# Patient Record
Sex: FEMALE | Race: WHITE | NOT HISPANIC OR LATINO | Employment: FULL TIME | URBAN - METROPOLITAN AREA
[De-identification: names, ages, dates, MRNs, and addresses within clinical notes are randomized per-mention and may not be internally consistent; named-entity substitution may affect disease eponyms.]

---

## 2017-01-24 PROBLEM — R63.1 POLYDIPSIA: Status: ACTIVE | Noted: 2017-01-24

## 2017-01-24 PROBLEM — I10 HYPERTENSION: Status: ACTIVE | Noted: 2017-01-24

## 2017-01-25 ENCOUNTER — OFFICE VISIT (OUTPATIENT)
Dept: FAMILY MEDICINE CLINIC | Facility: CLINIC | Age: 27
End: 2017-01-25

## 2017-01-25 VITALS
DIASTOLIC BLOOD PRESSURE: 68 MMHG | BODY MASS INDEX: 43.4 KG/M2 | OXYGEN SATURATION: 97 % | TEMPERATURE: 98.6 F | WEIGHT: 293 LBS | HEART RATE: 93 BPM | HEIGHT: 69 IN | SYSTOLIC BLOOD PRESSURE: 142 MMHG

## 2017-01-25 DIAGNOSIS — Z23 IMMUNIZATION DUE: ICD-10-CM

## 2017-01-25 DIAGNOSIS — J02.9 SORE THROAT: Primary | ICD-10-CM

## 2017-01-25 DIAGNOSIS — IMO0001 ELEVATED BLOOD PRESSURE: ICD-10-CM

## 2017-01-25 PROBLEM — J45.909 AB (ASTHMATIC BRONCHITIS): Status: ACTIVE | Noted: 2017-01-25

## 2017-01-25 LAB
EXPIRATION DATE: NORMAL
INTERNAL CONTROL: NORMAL
Lab: NORMAL
S PYO AG THROAT QL: NEGATIVE

## 2017-01-25 PROCEDURE — 99214 OFFICE O/P EST MOD 30 MIN: CPT | Performed by: NURSE PRACTITIONER

## 2017-01-25 PROCEDURE — 87081 CULTURE SCREEN ONLY: CPT | Performed by: NURSE PRACTITIONER

## 2017-01-25 PROCEDURE — 87880 STREP A ASSAY W/OPTIC: CPT | Performed by: NURSE PRACTITIONER

## 2017-01-25 PROCEDURE — 90471 IMMUNIZATION ADMIN: CPT | Performed by: NURSE PRACTITIONER

## 2017-01-25 PROCEDURE — 90715 TDAP VACCINE 7 YRS/> IM: CPT | Performed by: NURSE PRACTITIONER

## 2017-01-25 NOTE — PROGRESS NOTES
Subjective   Herbie Gloria is a 26 y.o. female.     History of Present Illness Complaining of 2 day so nasal congestion, sore throat, and cold chills. Denies sinus pressure, cough, SOB, and wheezing. Treated with Dayquil and Nyquil. No flu vacc.  Denies headache, chest pain, SOB, edema. No known history of elevated blood pressure. Diet is high in sodium.  She does not smoke or exercise.    The following portions of the patient's history were reviewed and updated as appropriate: allergies, current medications, past family history, past medical history, past social history, past surgical history and problem list.    Review of Systems   Constitutional: Positive for fatigue. Negative for fever and unexpected weight change.   HENT: Positive for congestion and sore throat. Negative for hearing loss, nosebleeds, rhinorrhea, trouble swallowing and voice change.    Eyes: Negative for pain, discharge, redness and visual disturbance.   Respiratory: Negative for cough, chest tightness, shortness of breath and wheezing.    Cardiovascular: Negative for chest pain, palpitations and leg swelling.   Gastrointestinal: Negative for abdominal distention, abdominal pain, anal bleeding, blood in stool, constipation, diarrhea, nausea and vomiting.   Endocrine: Negative for cold intolerance, heat intolerance, polydipsia, polyphagia and polyuria.   Genitourinary: Negative for dysuria, flank pain, frequency and hematuria.   Musculoskeletal: Negative for arthralgias, gait problem, joint swelling and myalgias.   Skin: Negative for color change and rash.   Neurological: Negative for dizziness, tremors, seizures, syncope, speech difficulty, weakness, numbness and headaches.   Hematological: Negative.    Psychiatric/Behavioral: Negative.        Objective   Physical Exam   Constitutional: She is oriented to person, place, and time. She appears well-developed and well-nourished. No distress.   HENT:   Head: Normocephalic and atraumatic.   Right  Ear: External ear normal. Tympanic membrane is erythematous.   Left Ear: External ear normal. Tympanic membrane is erythematous.   Nose: Nose normal.   Mouth/Throat: Posterior oropharyngeal erythema present. No oropharyngeal exudate.   Eyes: Conjunctivae are normal. Pupils are equal, round, and reactive to light. Right eye exhibits no discharge. Left eye exhibits no discharge. No scleral icterus.   Neck: Neck supple. No tracheal deviation present. No thyromegaly present.   Cardiovascular: Normal rate, regular rhythm and normal heart sounds.  Exam reveals no gallop and no friction rub.    No murmur heard.  Pulmonary/Chest: Effort normal and breath sounds normal. No respiratory distress. She has no wheezes.   Abdominal: Soft. Bowel sounds are normal. She exhibits no distension and no mass. There is no tenderness.   Musculoskeletal: She exhibits no edema or deformity.   Lymphadenopathy:        Head (right side): Tonsillar adenopathy present.        Head (left side): Tonsillar adenopathy present.     She has no cervical adenopathy.   Neurological: She is alert and oriented to person, place, and time. Coordination normal.   Skin: Skin is warm and dry. No rash noted. No erythema.   Psychiatric: She has a normal mood and affect. Her speech is normal and behavior is normal. Judgment and thought content normal.   Nursing note and vitals reviewed.      Assessment/Plan   Herbie was seen today for sore throat.    Diagnoses and all orders for this visit:    Sore throat  -     POC Rapid Strep A    Elevated blood pressure    Immunization due  -     Tdap Vaccine Greater Than or Equal To 8yo IM    Likely viral syndrome. Follow up symptoms persist or worsen.  Discussed the nature of the disease including, risks, complications, implications, management, safe and proper use of medications. Encouraged therapeutic lifestyle changes including low calorie diet with plenty of fruits and vegetables, daily exercise, medication compliance,  and keeping scheduled follow up appointments with me and any other providers. Encouraged patient to have appointment for complete physical, fasting labs, appropriate screenings, and immunizations on an annual basis.  Discussed low sodium diet, need for weight loss, increase exercise. Needs CPX.

## 2017-01-25 NOTE — MR AVS SNAPSHOT
"                        Herbie Gloria   2017 8:30 AM   Office Visit    Provider:  Yani Thompson DNP, APRN   Department:  [facility]   Dept Phone:  [phone]                Your Full Care Plan              Your Updated Medication List      Notice  As of 2017  9:41 AM    You have not been prescribed any medications.            We Performed the Following     POC Rapid Strep A     Tdap Vaccine Greater Than or Equal To 8yo IM       You Were Diagnosed With        Codes Comments    Sore throat    -  Primary ICD-10-CM: J02.9  ICD-9-CM: 462     Elevated blood pressure     ICD-10-CM: I10  ICD-9-CM: 401.9     Immunization due     ICD-10-CM: Z23  ICD-9-CM: V05.9       Instructions     None    Patient Instructions History      [employer] Signup     [facility] allows you to send messages to your doctor, view your test results, renew your prescriptions, schedule appointments, and more. To sign up, go to [place] and click on the Sign Up Now link in the New User? box. Enter your [employer] Activation Code exactly as it appears below along with the last four digits of your Social Security Number and your Date of Birth () to complete the sign-up process. If you do not sign up before the expiration date, you must request a new code.    [employer] Activation Code: [email or web address]  Expires: 2017  9:41 AM    If you have questions, you can email [email or web address] or call [phone] to talk to our [employer] staff. Remember, Will[employer] is NOT to be used for urgent needs. For medical emergencies, dial 911.               Other Info from Your Visit           Allergies     Latex        Reason for Visit     Sore Throat drainage, sore throat, chills, body sore, not sure if had a fever.      Vital Signs     Blood Pressure Pulse Temperature Height Weight Oxygen Saturation    142/68 93 98.6 °F (37 °C) 69\" (175.3 cm) 355 lb (161 kg) 97%    Body Mass Index " Smoking Status                52.42 kg/m2 Former Smoker          Problems and Diagnoses Noted     Sore throat    -  Primary    Elevated blood pressure        Immunization due

## 2017-01-27 LAB — BACTERIA SPEC AEROBE CULT: NORMAL

## 2017-02-01 ENCOUNTER — OFFICE VISIT (OUTPATIENT)
Dept: FAMILY MEDICINE CLINIC | Facility: CLINIC | Age: 27
End: 2017-02-01

## 2017-02-01 VITALS
TEMPERATURE: 99.2 F | SYSTOLIC BLOOD PRESSURE: 136 MMHG | WEIGHT: 293 LBS | HEIGHT: 69 IN | BODY MASS INDEX: 43.4 KG/M2 | HEART RATE: 114 BPM | DIASTOLIC BLOOD PRESSURE: 80 MMHG | OXYGEN SATURATION: 98 %

## 2017-02-01 DIAGNOSIS — J40 BRONCHITIS: ICD-10-CM

## 2017-02-01 DIAGNOSIS — J04.0 LARYNGITIS: Primary | ICD-10-CM

## 2017-02-01 PROCEDURE — 86665 EPSTEIN-BARR CAPSID VCA: CPT | Performed by: NURSE PRACTITIONER

## 2017-02-01 PROCEDURE — 86664 EPSTEIN-BARR NUCLEAR ANTIGEN: CPT | Performed by: NURSE PRACTITIONER

## 2017-02-01 PROCEDURE — 99213 OFFICE O/P EST LOW 20 MIN: CPT | Performed by: NURSE PRACTITIONER

## 2017-02-01 PROCEDURE — 36415 COLL VENOUS BLD VENIPUNCTURE: CPT | Performed by: NURSE PRACTITIONER

## 2017-02-01 PROCEDURE — 86663 EPSTEIN-BARR ANTIBODY: CPT | Performed by: NURSE PRACTITIONER

## 2017-02-01 RX ORDER — METHYLPREDNISOLONE 4 MG/1
TABLET ORAL
Qty: 21 TABLET | Refills: 0 | Status: SHIPPED | OUTPATIENT
Start: 2017-02-01 | End: 2018-12-14

## 2017-02-01 RX ORDER — AZITHROMYCIN 250 MG/1
TABLET, FILM COATED ORAL
Qty: 6 TABLET | Refills: 0 | Status: SHIPPED | OUTPATIENT
Start: 2017-02-01 | End: 2018-12-14

## 2017-02-01 NOTE — PROGRESS NOTES
Subjective   Herbie Gloria is a 26 y.o. female.     History of Present Illness Pt presents to the clinic today with no relief of symptoms from last visit on 1/25/17 pt was ruled out for strep throat . Pt complains of sore throat, nasal congestion, post nasal drip, and non productive  cough that keeps her up at night . Denies Fever, SOB, no N/V/D. Pt has taken Musinex liquid with some relief since Monday and  Dayqil/ Nyquil with no relief.     The following portions of the patient's history were reviewed and updated as appropriate: allergies, current medications, past family history, past medical history, past social history, past surgical history and problem list.    Review of Systems   Constitutional: Positive for fatigue. Negative for fever and unexpected weight change.   HENT: Positive for postnasal drip, rhinorrhea, sore throat and voice change. Negative for congestion, hearing loss, nosebleeds and trouble swallowing.    Eyes: Negative for pain, discharge, redness and visual disturbance.   Respiratory: Negative for cough, chest tightness, shortness of breath and wheezing.    Cardiovascular: Negative for chest pain, palpitations and leg swelling.   Gastrointestinal: Negative for abdominal distention, abdominal pain, anal bleeding, blood in stool, constipation, diarrhea, nausea and vomiting.   Endocrine: Negative for cold intolerance, heat intolerance, polydipsia, polyphagia and polyuria.   Genitourinary: Negative for dysuria, flank pain, frequency and hematuria.   Musculoskeletal: Negative for arthralgias, gait problem, joint swelling and myalgias.   Skin: Negative for color change and rash.   Neurological: Negative for dizziness, tremors, seizures, syncope, speech difficulty, weakness, numbness and headaches.   Hematological: Negative.    Psychiatric/Behavioral: Negative.        Objective   Physical Exam   Constitutional: She is oriented to person, place, and time. She appears well-developed and  well-nourished. No distress.   Hoarse voice     HENT:   Head: Normocephalic and atraumatic.   Right Ear: Tympanic membrane and external ear normal.   Left Ear: Tympanic membrane and external ear normal.   Nose: Nose normal.   Mouth/Throat: Oropharynx is clear and moist. No oropharyngeal exudate.   Eyes: Conjunctivae are normal. Pupils are equal, round, and reactive to light. Right eye exhibits no discharge. Left eye exhibits no discharge. No scleral icterus.   Neck: Neck supple. No tracheal deviation present. No thyromegaly present.   Cardiovascular: Normal rate, regular rhythm and normal heart sounds.  Exam reveals no gallop and no friction rub.    No murmur heard.  Pulmonary/Chest: Effort normal and breath sounds normal. No respiratory distress. She has no wheezes.   Abdominal: Soft. Bowel sounds are normal. She exhibits no distension and no mass. There is no tenderness.   Musculoskeletal: She exhibits no edema or deformity.   Lymphadenopathy:     She has no cervical adenopathy.   Neurological: She is alert and oriented to person, place, and time. Coordination normal.   Skin: Skin is warm and dry. No rash noted. No erythema.   Psychiatric: She has a normal mood and affect. Her speech is normal and behavior is normal. Judgment and thought content normal.   Nursing note and vitals reviewed.      Assessment/Plan   Herbie was seen today for laryngitis.    Diagnoses and all orders for this visit:    Laryngitis  -     MethylPREDNISolone (MEDROL, GARRISON,) 4 MG tablet; Take as directed on package instructions.  -     Mark-Barr Virus VCA Antibody Panel    Bronchitis  -     MethylPREDNISolone (MEDROL, GARRISON,) 4 MG tablet; Take as directed on package instructions.  -     azithromycin (ZITHROMAX Z-GARRISON) 250 MG tablet; Take 2 tablets the first day, then 1 tablet daily for 4 days.    Symptomatic treatment. Follow up symptoms persist or worsen.

## 2017-02-03 LAB
EBV EA IGG SER-ACNC: <9 U/ML (ref 0–8.9)
EBV NA IGG SER IA-ACNC: >600 U/ML (ref 0–17.9)
EBV VCA IGG SER-ACNC: 222 U/ML (ref 0–17.9)
EBV VCA IGM SER-ACNC: <36 U/ML (ref 0–35.9)
INTERPRETATION: ABNORMAL

## 2018-12-14 ENCOUNTER — OFFICE VISIT (OUTPATIENT)
Dept: FAMILY MEDICINE CLINIC | Facility: CLINIC | Age: 28
End: 2018-12-14

## 2018-12-14 VITALS
WEIGHT: 293 LBS | TEMPERATURE: 98.4 F | OXYGEN SATURATION: 96 % | RESPIRATION RATE: 18 BRPM | SYSTOLIC BLOOD PRESSURE: 152 MMHG | HEIGHT: 69 IN | HEART RATE: 120 BPM | BODY MASS INDEX: 43.4 KG/M2 | DIASTOLIC BLOOD PRESSURE: 80 MMHG

## 2018-12-14 DIAGNOSIS — F41.9 ANXIETY: ICD-10-CM

## 2018-12-14 DIAGNOSIS — F34.1 DYSTHYMIA: ICD-10-CM

## 2018-12-14 DIAGNOSIS — R52 GENERALIZED BODY ACHES: Primary | ICD-10-CM

## 2018-12-14 DIAGNOSIS — J01.80 OTHER ACUTE SINUSITIS, RECURRENCE NOT SPECIFIED: ICD-10-CM

## 2018-12-14 LAB
EXPIRATION DATE: NORMAL
EXPIRATION DATE: NORMAL
FLUAV AG NPH QL: NEGATIVE
FLUBV AG NPH QL: NEGATIVE
INTERNAL CONTROL: NORMAL
INTERNAL CONTROL: NORMAL
Lab: NORMAL
Lab: NORMAL
S PYO AG THROAT QL: NEGATIVE

## 2018-12-14 PROCEDURE — 86663 EPSTEIN-BARR ANTIBODY: CPT | Performed by: NURSE PRACTITIONER

## 2018-12-14 PROCEDURE — 86665 EPSTEIN-BARR CAPSID VCA: CPT | Performed by: NURSE PRACTITIONER

## 2018-12-14 PROCEDURE — 90471 IMMUNIZATION ADMIN: CPT | Performed by: NURSE PRACTITIONER

## 2018-12-14 PROCEDURE — 36415 COLL VENOUS BLD VENIPUNCTURE: CPT | Performed by: NURSE PRACTITIONER

## 2018-12-14 PROCEDURE — 87804 INFLUENZA ASSAY W/OPTIC: CPT | Performed by: NURSE PRACTITIONER

## 2018-12-14 PROCEDURE — 87880 STREP A ASSAY W/OPTIC: CPT | Performed by: NURSE PRACTITIONER

## 2018-12-14 PROCEDURE — 99214 OFFICE O/P EST MOD 30 MIN: CPT | Performed by: NURSE PRACTITIONER

## 2018-12-14 PROCEDURE — 86664 EPSTEIN-BARR NUCLEAR ANTIGEN: CPT | Performed by: NURSE PRACTITIONER

## 2018-12-14 RX ORDER — ESCITALOPRAM OXALATE 10 MG/1
10 TABLET ORAL DAILY
Qty: 30 TABLET | Refills: 2 | Status: SHIPPED | OUTPATIENT
Start: 2018-12-14 | End: 2019-01-11 | Stop reason: DRUGHIGH

## 2018-12-14 RX ORDER — CEFDINIR 300 MG/1
300 CAPSULE ORAL 2 TIMES DAILY
Qty: 20 CAPSULE | Refills: 0 | Status: SHIPPED | OUTPATIENT
Start: 2018-12-14 | End: 2019-01-11

## 2018-12-14 RX ORDER — ALBUTEROL SULFATE 90 UG/1
2 AEROSOL, METERED RESPIRATORY (INHALATION) EVERY 4 HOURS PRN
Qty: 1 INHALER | Refills: 11 | Status: SHIPPED | OUTPATIENT
Start: 2018-12-14 | End: 2020-04-06 | Stop reason: SDUPTHER

## 2018-12-14 NOTE — PROGRESS NOTES
Subjective   Herbie Gloria is a 28 y.o. female.     History of Present Illness Has seasonal allergies. For the last 2 weeks, has had nasal congestion. Last night she had fever, chills, temp 101.4, post nasal drainage, some wheezing and sob. She also had some myalgia. Had mono in the past and feels the same. She has a sore throat. Treated with Sudaphed and mucinex.  Also asks for resources for mental health. Has had anxiety and depression off and on since September. Recently  her wife. Has a high stress job. Not suicidal. Has not been on meds in the past.     The following portions of the patient's history were reviewed and updated as appropriate: allergies, current medications, past family history, past medical history, past social history, past surgical history and problem list.    Review of Systems   Constitutional: Positive for fatigue and fever. Negative for appetite change, unexpected weight gain and unexpected weight loss.   HENT: Positive for congestion, postnasal drip, rhinorrhea, sinus pressure, sneezing and sore throat. Negative for nosebleeds and trouble swallowing.    Eyes: Negative for visual disturbance.   Respiratory: Positive for cough. Negative for shortness of breath and wheezing.    Cardiovascular: Negative for chest pain, palpitations and leg swelling.   Gastrointestinal: Negative for abdominal pain, blood in stool, constipation, diarrhea, nausea and vomiting.   Endocrine: Negative for polydipsia, polyphagia and polyuria.   Genitourinary: Negative for dysuria, frequency and hematuria.   Musculoskeletal: Negative for arthralgias, joint swelling and myalgias.   Skin: Negative for rash.   Neurological: Negative for dizziness, seizures, syncope and numbness.   Hematological: Negative for adenopathy. Does not bruise/bleed easily.   Psychiatric/Behavioral: Positive for sleep disturbance and depressed mood. Negative for behavioral problems. The patient is nervous/anxious.        Objective    Physical Exam   Constitutional: She is oriented to person, place, and time. She appears well-developed and well-nourished. No distress.   HENT:   Head: Normocephalic and atraumatic.   Right Ear: External ear normal. A middle ear effusion is present.   Left Ear: External ear normal. A middle ear effusion is present.   Nose: Right sinus exhibits maxillary sinus tenderness and frontal sinus tenderness. Left sinus exhibits maxillary sinus tenderness and frontal sinus tenderness.   Mouth/Throat: Oropharynx is clear and moist. No oropharyngeal exudate.   Eyes: Conjunctivae are normal. Pupils are equal, round, and reactive to light. Right eye exhibits no discharge. Left eye exhibits no discharge. No scleral icterus.   Neck: Neck supple. No tracheal deviation present. No thyromegaly present.   Cardiovascular: Normal rate, regular rhythm and normal heart sounds. Exam reveals no gallop and no friction rub.   No murmur heard.  Pulmonary/Chest: Effort normal and breath sounds normal. No respiratory distress. She has no wheezes.   Abdominal: Soft. Bowel sounds are normal. She exhibits no distension and no mass. There is no tenderness.   Musculoskeletal: She exhibits no edema or deformity.   Lymphadenopathy:     She has no cervical adenopathy.   Neurological: She is alert and oriented to person, place, and time. Coordination normal.   Skin: Skin is warm and dry. Capillary refill takes less than 2 seconds. No rash noted. No erythema.   Psychiatric: Her speech is normal and behavior is normal. Judgment and thought content normal.   Tearful   Nursing note and vitals reviewed.        Assessment/Plan   Herbie was seen today for uri.    Diagnoses and all orders for this visit:    Generalized body aches  -     POC Rapid Strep A  -     POC Influenza A / B  -     cefdinir (OMNICEF) 300 MG capsule; Take 1 capsule by mouth 2 (Two) Times a Day.  -     albuterol 108 (90 Base) MCG/ACT inhaler; Inhale 2 puffs Every 4 (Four) Hours As Needed  for Wheezing.  -     Mark-Barr Virus VCA Antibody Panel    Other acute sinusitis, recurrence not specified  -     cefdinir (OMNICEF) 300 MG capsule; Take 1 capsule by mouth 2 (Two) Times a Day.  -     albuterol 108 (90 Base) MCG/ACT inhaler; Inhale 2 puffs Every 4 (Four) Hours As Needed for Wheezing.  -     Mark-Barr Virus VCA Antibody Panel    Dysthymia  -     escitalopram (LEXAPRO) 10 MG tablet; Take 1 tablet by mouth Daily.  -     Ambulatory Referral to Psychology    Anxiety  -     Ambulatory Referral to Psychology    Discussed the nature of the disease including, risks, complications, implications, management, safe and proper use of medications. Encouraged therapeutic lifestyle changes including low calorie diet with plenty of fruits and vegetables, daily exercise, medication compliance, and keeping scheduled follow up appointments with me and any other providers. Encouraged patient to have appointment for complete physical, fasting labs, appropriate screenings, and immunizations on an annual basis.  Follow up in one month or sooner prn.

## 2018-12-15 LAB
EBV EA IGG SER-ACNC: <9 U/ML (ref 0–8.9)
EBV NA IGG SER IA-ACNC: >600 U/ML (ref 0–17.9)
EBV VCA IGG SER-ACNC: 298 U/ML (ref 0–17.9)
EBV VCA IGM SER-ACNC: <36 U/ML (ref 0–35.9)
INTERPRETATION: ABNORMAL

## 2019-01-11 ENCOUNTER — OFFICE VISIT (OUTPATIENT)
Dept: FAMILY MEDICINE CLINIC | Facility: CLINIC | Age: 29
End: 2019-01-11

## 2019-01-11 VITALS
WEIGHT: 293 LBS | RESPIRATION RATE: 16 BRPM | HEART RATE: 119 BPM | DIASTOLIC BLOOD PRESSURE: 90 MMHG | SYSTOLIC BLOOD PRESSURE: 150 MMHG | TEMPERATURE: 99.3 F | BODY MASS INDEX: 55.08 KG/M2 | OXYGEN SATURATION: 98 %

## 2019-01-11 DIAGNOSIS — M79.10 MYALGIA: Primary | ICD-10-CM

## 2019-01-11 DIAGNOSIS — I10 ESSENTIAL HYPERTENSION: ICD-10-CM

## 2019-01-11 DIAGNOSIS — E66.01 CLASS 3 SEVERE OBESITY DUE TO EXCESS CALORIES WITHOUT SERIOUS COMORBIDITY WITH BODY MASS INDEX (BMI) OF 50.0 TO 59.9 IN ADULT (HCC): ICD-10-CM

## 2019-01-11 DIAGNOSIS — F41.9 ANXIETY: ICD-10-CM

## 2019-01-11 LAB
25(OH)D3 SERPL-MCNC: 10.3 NG/ML
ALBUMIN SERPL-MCNC: 4.27 G/DL (ref 3.2–4.8)
ALBUMIN/GLOB SERPL: 1.3 G/DL (ref 1.5–2.5)
ALP SERPL-CCNC: 54 U/L (ref 25–100)
ALT SERPL W P-5'-P-CCNC: 37 U/L (ref 7–40)
ANION GAP SERPL CALCULATED.3IONS-SCNC: 7 MMOL/L (ref 3–11)
AST SERPL-CCNC: 26 U/L (ref 0–33)
BASOPHILS # BLD AUTO: 0.03 10*3/MM3 (ref 0–0.2)
BASOPHILS NFR BLD AUTO: 0.3 % (ref 0–1)
BILIRUB SERPL-MCNC: 0.6 MG/DL (ref 0.3–1.2)
BUN BLD-MCNC: 16 MG/DL (ref 9–23)
BUN/CREAT SERPL: 19.8 (ref 7–25)
CALCIUM SPEC-SCNC: 9.4 MG/DL (ref 8.7–10.4)
CHLORIDE SERPL-SCNC: 101 MMOL/L (ref 99–109)
CK SERPL-CCNC: 70 U/L (ref 26–174)
CO2 SERPL-SCNC: 29 MMOL/L (ref 20–31)
CREAT BLD-MCNC: 0.81 MG/DL (ref 0.6–1.3)
CRP SERPL-MCNC: 2.86 MG/DL (ref 0–1)
DEPRECATED RDW RBC AUTO: 43 FL (ref 37–54)
EOSINOPHIL # BLD AUTO: 0.3 10*3/MM3 (ref 0–0.3)
EOSINOPHIL NFR BLD AUTO: 3.1 % (ref 0–3)
ERYTHROCYTE [DISTWIDTH] IN BLOOD BY AUTOMATED COUNT: 13.5 % (ref 11.3–14.5)
ERYTHROCYTE [SEDIMENTATION RATE] IN BLOOD: 70 MM/HR (ref 0–20)
GFR SERPL CREATININE-BSD FRML MDRD: 84 ML/MIN/1.73
GLOBULIN UR ELPH-MCNC: 3.2 GM/DL
GLUCOSE BLD-MCNC: 90 MG/DL (ref 70–100)
HCT VFR BLD AUTO: 40.2 % (ref 34.5–44)
HGB BLD-MCNC: 12.6 G/DL (ref 11.5–15.5)
IMM GRANULOCYTES # BLD AUTO: 0.02 10*3/MM3 (ref 0–0.03)
IMM GRANULOCYTES NFR BLD AUTO: 0.2 % (ref 0–0.6)
LYMPHOCYTES # BLD AUTO: 2.85 10*3/MM3 (ref 0.6–4.8)
LYMPHOCYTES NFR BLD AUTO: 29.6 % (ref 24–44)
MCH RBC QN AUTO: 27.6 PG (ref 27–31)
MCHC RBC AUTO-ENTMCNC: 31.3 G/DL (ref 32–36)
MCV RBC AUTO: 88 FL (ref 80–99)
MONOCYTES # BLD AUTO: 0.55 10*3/MM3 (ref 0–1)
MONOCYTES NFR BLD AUTO: 5.7 % (ref 0–12)
NEUTROPHILS # BLD AUTO: 5.89 10*3/MM3 (ref 1.5–8.3)
NEUTROPHILS NFR BLD AUTO: 61.1 % (ref 41–71)
PLATELET # BLD AUTO: 281 10*3/MM3 (ref 150–450)
PMV BLD AUTO: 11.8 FL (ref 6–12)
POTASSIUM BLD-SCNC: 3.9 MMOL/L (ref 3.5–5.5)
PROT SERPL-MCNC: 7.5 G/DL (ref 5.7–8.2)
RBC # BLD AUTO: 4.57 10*6/MM3 (ref 3.89–5.14)
SODIUM BLD-SCNC: 137 MMOL/L (ref 132–146)
TSH SERPL DL<=0.05 MIU/L-ACNC: 2.4 MIU/ML (ref 0.35–5.35)
WBC NRBC COR # BLD: 9.64 10*3/MM3 (ref 3.5–10.8)

## 2019-01-11 PROCEDURE — 82306 VITAMIN D 25 HYDROXY: CPT | Performed by: NURSE PRACTITIONER

## 2019-01-11 PROCEDURE — 85025 COMPLETE CBC W/AUTO DIFF WBC: CPT | Performed by: NURSE PRACTITIONER

## 2019-01-11 PROCEDURE — 36415 COLL VENOUS BLD VENIPUNCTURE: CPT | Performed by: NURSE PRACTITIONER

## 2019-01-11 PROCEDURE — 84443 ASSAY THYROID STIM HORMONE: CPT | Performed by: NURSE PRACTITIONER

## 2019-01-11 PROCEDURE — 99214 OFFICE O/P EST MOD 30 MIN: CPT | Performed by: NURSE PRACTITIONER

## 2019-01-11 PROCEDURE — 86140 C-REACTIVE PROTEIN: CPT | Performed by: NURSE PRACTITIONER

## 2019-01-11 PROCEDURE — 86430 RHEUMATOID FACTOR TEST QUAL: CPT | Performed by: NURSE PRACTITIONER

## 2019-01-11 PROCEDURE — 82550 ASSAY OF CK (CPK): CPT | Performed by: NURSE PRACTITIONER

## 2019-01-11 PROCEDURE — 80053 COMPREHEN METABOLIC PANEL: CPT | Performed by: NURSE PRACTITIONER

## 2019-01-11 PROCEDURE — 86038 ANTINUCLEAR ANTIBODIES: CPT | Performed by: NURSE PRACTITIONER

## 2019-01-11 RX ORDER — ESCITALOPRAM OXALATE 20 MG/1
20 TABLET ORAL DAILY
Qty: 30 TABLET | Refills: 0
Start: 2019-01-11 | End: 2019-08-19

## 2019-01-11 NOTE — PROGRESS NOTES
Subjective   Herbie Gloria is a 28 y.o. female.     History of Present Illness Patient here to follow up on multiple problems.  1. Diagnosed with mono. Still has some fatigue but complains more of myalgia. Will stretch and walk but feels like all of her muscles are sore. No fever.  2. Also with increased stress, anxiety and depression. Has been seen by a therapist at [facility]. Has appt for med management in 2 weeks. Doesn't think current dose of Lexapro is helping.  3. BP was elevated at last visit. She is not exercising or following a low rama or low carb diet. Understands she may need to start medication but would rather try diet and exercise.    The following portions of the patient's history were reviewed and updated as appropriate: allergies, current medications, past family history, past medical history, past social history, past surgical history and problem list.    Review of Systems   Constitutional: Negative for appetite change, fever, unexpected weight gain and unexpected weight loss.   HENT: Negative for congestion, nosebleeds, sore throat and trouble swallowing.    Eyes: Negative for visual disturbance.   Respiratory: Negative for cough, shortness of breath and wheezing.    Cardiovascular: Negative for chest pain, palpitations and leg swelling.   Gastrointestinal: Negative for abdominal pain, blood in stool, constipation, diarrhea, nausea and vomiting.   Endocrine: Negative for polydipsia, polyphagia and polyuria.   Genitourinary: Negative for dysuria, frequency and hematuria.   Musculoskeletal: Positive for myalgias. Negative for arthralgias and joint swelling.   Skin: Negative for rash.   Neurological: Negative for dizziness, seizures, syncope and numbness.   Hematological: Negative for adenopathy. Does not bruise/bleed easily.   Psychiatric/Behavioral: Positive for depressed mood. Negative for behavioral problems and sleep disturbance. The patient is nervous/anxious.         Objective   Physical Exam   Constitutional: She is oriented to person, place, and time. She appears well-developed and well-nourished. No distress.   HENT:   Head: Normocephalic and atraumatic.   Right Ear: Tympanic membrane and external ear normal.   Left Ear: Tympanic membrane and external ear normal.   Nose: Nose normal.   Mouth/Throat: Oropharynx is clear and moist. No oropharyngeal exudate.   Eyes: Conjunctivae are normal. Pupils are equal, round, and reactive to light. Right eye exhibits no discharge. Left eye exhibits no discharge. No scleral icterus.   Neck: Neck supple. No tracheal deviation present. No thyromegaly present.   Cardiovascular: Normal rate, regular rhythm and normal heart sounds. Exam reveals no gallop and no friction rub.   No murmur heard.  Pulmonary/Chest: Effort normal and breath sounds normal. No respiratory distress. She has no wheezes.   Abdominal: Soft. Bowel sounds are normal. She exhibits no distension and no mass. There is no tenderness.   Musculoskeletal: She exhibits no edema or deformity.   Lymphadenopathy:     She has no cervical adenopathy.   Neurological: She is alert and oriented to person, place, and time. Coordination normal.   Skin: Skin is warm and dry. Capillary refill takes less than 2 seconds. No rash noted. No erythema.   Psychiatric: She has a normal mood and affect. Her speech is normal and behavior is normal. Judgment and thought content normal.   Nursing note and vitals reviewed.        Assessment/Plan   Herbie was seen today for mononucleosis and anxiety.    Diagnoses and all orders for this visit:    Myalgia  -     SHREE With / DsDNA, RNP, Sjogrens A / B, Smith  -     C-reactive Protein  -     Rheumatoid Factor  -     Sedimentation Rate  -     CBC & Differential  -     Comprehensive Metabolic Panel  -     TSH  -     Vitamin D 25 Hydroxy  -     CK    Anxiety  -     escitalopram (LEXAPRO) 20 MG tablet; Take 1 tablet by mouth Daily.    Essential  hypertension    Class 3 severe obesity due to excess calories without serious comorbidity with body mass index (BMI) of 50.0 to 59.9 in adult (CMS/[facility])    Discussed the nature of the disease including, risks, complications, implications, management, safe and proper use of medications. Encouraged therapeutic lifestyle changes including low calorie diet with plenty of fruits and vegetables, daily exercise, medication compliance, and keeping scheduled follow up appointments with me and any other providers. Encouraged patient to have appointment for complete physical, fasting labs, appropriate screenings, and immunizations on an annual basis.  Increase Lexapro and keep appt with psyche.  Obtain labs for myalgia.  Follow up in 2 weeks.

## 2019-01-14 DIAGNOSIS — E55.9 VITAMIN D DEFICIENCY: Primary | ICD-10-CM

## 2019-01-14 LAB
ANA SER QL: NEGATIVE
RHEUMATOID FACT SERPL-ACNC: NEGATIVE [IU]/ML

## 2019-01-14 RX ORDER — ERGOCALCIFEROL 1.25 MG/1
50000 CAPSULE ORAL WEEKLY
Qty: 12 CAPSULE | Refills: 0 | Status: SHIPPED | OUTPATIENT
Start: 2019-01-14 | End: 2019-08-19

## 2019-01-16 ENCOUNTER — OFFICE VISIT (OUTPATIENT)
Dept: FAMILY MEDICINE CLINIC | Facility: CLINIC | Age: 29
End: 2019-01-16

## 2019-01-16 VITALS
TEMPERATURE: 98.6 F | SYSTOLIC BLOOD PRESSURE: 160 MMHG | DIASTOLIC BLOOD PRESSURE: 120 MMHG | BODY MASS INDEX: 43.4 KG/M2 | HEIGHT: 69 IN | RESPIRATION RATE: 18 BRPM | HEART RATE: 108 BPM | WEIGHT: 293 LBS | OXYGEN SATURATION: 98 %

## 2019-01-16 DIAGNOSIS — J02.9 SORE THROAT: ICD-10-CM

## 2019-01-16 DIAGNOSIS — R68.89 FLU-LIKE SYMPTOMS: ICD-10-CM

## 2019-01-16 DIAGNOSIS — J06.9 ACUTE URI: Primary | ICD-10-CM

## 2019-01-16 PROBLEM — R63.1 POLYDIPSIA: Status: RESOLVED | Noted: 2017-01-24 | Resolved: 2019-01-16

## 2019-01-16 PROBLEM — J45.909 AB (ASTHMATIC BRONCHITIS): Status: RESOLVED | Noted: 2017-01-25 | Resolved: 2019-01-16

## 2019-01-16 PROCEDURE — 87880 STREP A ASSAY W/OPTIC: CPT | Performed by: NURSE PRACTITIONER

## 2019-01-16 PROCEDURE — 87804 INFLUENZA ASSAY W/OPTIC: CPT | Performed by: NURSE PRACTITIONER

## 2019-01-16 PROCEDURE — 99214 OFFICE O/P EST MOD 30 MIN: CPT | Performed by: NURSE PRACTITIONER

## 2019-01-16 RX ORDER — AZITHROMYCIN 250 MG/1
TABLET, FILM COATED ORAL
Qty: 6 TABLET | Refills: 0 | Status: SHIPPED | OUTPATIENT
Start: 2019-01-16 | End: 2019-01-21

## 2019-01-16 RX ORDER — ESCITALOPRAM OXALATE 10 MG/1
TABLET ORAL
COMMUNITY
Start: 2019-01-15 | End: 2019-01-16

## 2019-01-16 NOTE — PATIENT INSTRUCTIONS
"Upper Respiratory Infection, Adult  Most upper respiratory infections (URIs) are a viral infection of the air passages leading to the lungs. A URI affects the nose, throat, and upper air passages. The most common type of URI is nasopharyngitis and is typically referred to as \"the common cold.\"  URIs run their course and usually go away on their own. Most of the time, a URI does not require medical attention, but sometimes a bacterial infection in the upper airways can follow a viral infection. This is called a secondary infection. Sinus and middle ear infections are common types of secondary upper respiratory infections.  Bacterial pneumonia can also complicate a URI. A URI can worsen asthma and chronic obstructive pulmonary disease (COPD). Sometimes, these complications can require emergency medical care and may be life threatening.  What are the causes?  Almost all URIs are caused by viruses. A virus is a type of germ and can spread from one person to another.  What increases the risk?  You may be at risk for a URI if:  · You smoke.  · You have chronic heart or lung disease.  · You have a weakened defense (immune) system.  · You are very young or very old.  · You have nasal allergies or asthma.  · You work in crowded or poorly ventilated areas.  · You work in health care facilities or schools.    What are the signs or symptoms?  Symptoms typically develop 2-3 days after you come in contact with a cold virus. Most viral URIs last 7-10 days. However, viral URIs from the influenza virus (flu virus) can last 14-18 days and are typically more severe. Symptoms may include:  · Runny or stuffy (congested) nose.  · Sneezing.  · Cough.  · Sore throat.  · Headache.  · Fatigue.  · Fever.  · Loss of appetite.  · Pain in your forehead, behind your eyes, and over your cheekbones (sinus pain).  · Muscle aches.    How is this diagnosed?  Your health care provider may diagnose a URI by:  · Physical exam.  · Tests to check that your " symptoms are not due to another condition such as:  ? Strep throat.  ? Sinusitis.  ? Pneumonia.  ? Asthma.    How is this treated?  A URI goes away on its own with time. It cannot be cured with medicines, but medicines may be prescribed or recommended to relieve symptoms. Medicines may help:  · Reduce your fever.  · Reduce your cough.  · Relieve nasal congestion.    Follow these instructions at home:  · Take medicines only as directed by your health care provider.  · Gargle warm saltwater or take cough drops to comfort your throat as directed by your health care provider.  · Use a warm mist humidifier or inhale steam from a shower to increase air moisture. This may make it easier to breathe.  · Drink enough fluid to keep your urine clear or pale yellow.  · Eat soups and other clear broths and maintain good nutrition.  · Rest as needed.  · Return to work when your temperature has returned to normal or as your health care provider advises. You may need to stay home longer to avoid infecting others. You can also use a face mask and careful hand washing to prevent spread of the virus.  · Increase the usage of your inhaler if you have asthma.  · Do not use any tobacco products, including cigarettes, chewing tobacco, or electronic cigarettes. If you need help quitting, ask your health care provider.  How is this prevented?  The best way to protect yourself from getting a cold is to practice good hygiene.  · Avoid oral or hand contact with people with cold symptoms.  · Wash your hands often if contact occurs.    There is no clear evidence that vitamin C, vitamin E, echinacea, or exercise reduces the chance of developing a cold. However, it is always recommended to get plenty of rest, exercise, and practice good nutrition.  Contact a health care provider if:  · You are getting worse rather than better.  · Your symptoms are not controlled by medicine.  · You have chills.  · You have worsening shortness of breath.  · You have  brown or red mucus.  · You have yellow or brown nasal discharge.  · You have pain in your face, especially when you bend forward.  · You have a fever.  · You have swollen neck glands.  · You have pain while swallowing.  · You have white areas in the back of your throat.  Get help right away if:  · You have severe or persistent:  ? Headache.  ? Ear pain.  ? Sinus pain.  ? Chest pain.  · You have chronic lung disease and any of the following:  ? Wheezing.  ? Prolonged cough.  ? Coughing up blood.  ? A change in your usual mucus.  · You have a stiff neck.  · You have changes in your:  ? Vision.  ? Hearing.  ? Thinking.  ? Mood.  This information is not intended to replace advice given to you by your health care provider. Make sure you discuss any questions you have with your health care provider.  Document Released: 06/13/2002 Document Revised: 08/20/2017 Document Reviewed: 03/25/2015  [email or web address] Interactive Patient Education © 2018 [employer]  Azithromycin tablets  What is this medicine?  AZITHROMYCIN (az ith bahman SOLO sin) is a macrolide antibiotic. It is used to treat or prevent certain kinds of bacterial infections. It will not work for colds, flu, or other viral infections.  This medicine may be used for other purposes; ask your health care provider or pharmacist if you have questions.  COMMON BRAND NAME(S): Zithromax, Zithromax Tri-Senthil, Zithromax Z-Senthil  What should I tell my health care provider before I take this medicine?  They need to know if you have any of these conditions:  -kidney disease  -liver disease  -irregular heartbeat or heart disease  -an unusual or allergic reaction to azithromycin, erythromycin, other macrolide antibiotics, foods, dyes, or preservatives  -pregnant or trying to get pregnant  -breast-feeding  How should I use this medicine?  Take this medicine by mouth with a full glass of water. Follow the directions on the prescription label. The tablets can be taken with food or on an empty  stomach. If the medicine upsets your stomach, take it with food. Take your medicine at regular intervals. Do not take your medicine more often than directed. Take all of your medicine as directed even if you think your are better. Do not skip doses or stop your medicine early.  Talk to your pediatrician regarding the use of this medicine in children. While this drug may be prescribed for children as young as 6 months for selected conditions, precautions do apply.  Overdosage: If you think you have taken too much of this medicine contact a poison control center or emergency room at once.  NOTE: This medicine is only for you. Do not share this medicine with others.  What if I miss a dose?  If you miss a dose, take it as soon as you can. If it is almost time for your next dose, take only that dose. Do not take double or extra doses.  What may interact with this medicine?  Do not take this medicine with any of the following medications:  -lincomycin  This medicine may also interact with the following medications:  -amiodarone  -antacids  -birth control pills  -cyclosporine  -digoxin  -magnesium  -nelfinavir  -phenytoin  -warfarin  This list may not describe all possible interactions. Give your health care provider a list of all the medicines, herbs, non-prescription drugs, or dietary supplements you use. Also tell them if you smoke, drink alcohol, or use illegal drugs. Some items may interact with your medicine.  What should I watch for while using this medicine?  Tell your doctor or healthcare professional if your symptoms do not start to get better or if they get worse.  Do not treat diarrhea with over the counter products. Contact your doctor if you have diarrhea that lasts more than 2 days or if it is severe and watery.  This medicine can make you more sensitive to the sun. Keep out of the sun. If you cannot avoid being in the sun, wear protective clothing and use sunscreen. Do not use sun lamps or tanning  beds/booths.  What side effects may I notice from receiving this medicine?  Side effects that you should report to your doctor or health care professional as soon as possible:  -allergic reactions like skin rash, itching or hives, swelling of the face, lips, or tongue  -confusion, nightmares or hallucinations  -dark urine  -difficulty breathing  -hearing loss  -irregular heartbeat or chest pain  -pain or difficulty passing urine  -redness, blistering, peeling or loosening of the skin, including inside the mouth  -white patches or sores in the mouth  -yellowing of the eyes or skin  Side effects that usually do not require medical attention (report to your doctor or health care professional if they continue or are bothersome):  -diarrhea  -dizziness, drowsiness  -headache  -stomach upset or vomiting  -tooth discoloration  -vaginal irritation  This list may not describe all possible side effects. Call your doctor for medical advice about side effects. You may report side effects to FDA at 0-104-FDA-1159.  Where should I keep my medicine?  Keep out of the reach of children.  Store at room temperature between 15 and 30 degrees C (59 and 86 degrees F). Throw away any unused medicine after the expiration date.  NOTE: This sheet is a summary. It may not cover all possible information. If you have questions about this medicine, talk to your doctor, pharmacist, or health care provider.  © 2018 [employer] (2017-02-14 15:26:03)

## 2019-01-16 NOTE — PROGRESS NOTES
Subjective   Herbie Gloria is a 28 y.o. female.     URI    This is a new problem. Episode onset: 2 days ago. The problem has been gradually worsening. Associated symptoms include congestion, coughing, ear pain, headaches, a plugged ear sensation, sinus pain and a sore throat. Pertinent negatives include no abdominal pain, chest pain, diarrhea, dysuria, nausea, rash, vomiting or wheezing. She has tried nothing for the symptoms.       The following portions of the patient's history were reviewed and updated as appropriate: allergies, current medications, past family history, past medical history, past social history, past surgical history and problem list.     Allergies   Allergen Reactions   • Latex      Review of Systems   Constitutional: Positive for fatigue. Negative for appetite change, chills, diaphoresis and fever.   HENT: Positive for congestion, ear pain, postnasal drip, sinus pressure, sinus pain and sore throat.    Respiratory: Positive for cough. Negative for chest tightness, shortness of breath and wheezing.    Cardiovascular: Negative.  Negative for chest pain.   Gastrointestinal: Negative for abdominal pain, diarrhea, nausea and vomiting.   Genitourinary: Negative for dysuria.   Musculoskeletal: Positive for myalgias. Negative for arthralgias.   Skin: Negative for rash.   Neurological: Positive for headaches.       Objective   Physical Exam   Constitutional: She is oriented to person, place, and time. She appears well-developed and well-nourished. She is cooperative. She appears ill. No distress.   HENT:   Head: Normocephalic and atraumatic.   Right Ear: External ear normal. Tympanic membrane is injected and bulging. A middle ear effusion is present.   Left Ear: External ear normal. Tympanic membrane is injected and bulging. A middle ear effusion is present.   Nose: Mucosal edema present.   Mouth/Throat: Uvula is midline and mucous membranes are normal. Posterior oropharyngeal erythema present.    Neck: Normal range of motion. Neck supple. No thyromegaly present.   Cardiovascular: Normal rate, regular rhythm and normal heart sounds.   Pulmonary/Chest: Effort normal and breath sounds normal.   Lymphadenopathy:     She has cervical adenopathy.   Neurological: She is alert and oriented to person, place, and time.   Skin: Skin is warm, dry and intact. No rash noted. She is not diaphoretic.   Psychiatric: She has a normal mood and affect. Her behavior is normal. Judgment and thought content normal.   Vitals reviewed.    Vitals:    01/16/19 1549   BP: (!) 160/120   Pulse: 108   Resp: 18   Temp: 98.6 °F (37 °C)   SpO2: 98%   *Blood pressure rechecked by me with result of 148/98.  Assessment/Plan   Herbie was seen today for flu symptoms and sore throat.    Diagnoses and all orders for this visit:    Acute URI  -     azithromycin (ZITHROMAX) 250 MG tablet; Take 2 tablets the first day, then 1 tablet daily for 4 days.    Flu-like symptoms  -     POCT Influenza A/B    Sore throat  -     POCT rapid strep A      Lab Results   Component Value Date    RAPFLUA Negative 01/16/2019    RAPFLUB Negative 01/16/2019     Lab Results   Component Value Date    RAPSCRN Negative 01/16/2019       Discussed the nature of the medical condition(s) risks, complications, implications, management, safe and proper use of medications. Encouraged medication compliance, and keeping scheduled follow up appointments with me and any other providers.

## 2019-08-19 ENCOUNTER — OFFICE VISIT (OUTPATIENT)
Dept: FAMILY MEDICINE CLINIC | Facility: CLINIC | Age: 29
End: 2019-08-19

## 2019-08-19 VITALS
WEIGHT: 293 LBS | HEART RATE: 131 BPM | BODY MASS INDEX: 55.82 KG/M2 | RESPIRATION RATE: 18 BRPM | DIASTOLIC BLOOD PRESSURE: 80 MMHG | SYSTOLIC BLOOD PRESSURE: 140 MMHG | TEMPERATURE: 97.1 F | OXYGEN SATURATION: 98 %

## 2019-08-19 DIAGNOSIS — I10 ESSENTIAL HYPERTENSION: ICD-10-CM

## 2019-08-19 DIAGNOSIS — M65.4 DE QUERVAIN'S TENOSYNOVITIS, RIGHT: Primary | ICD-10-CM

## 2019-08-19 PROCEDURE — 99213 OFFICE O/P EST LOW 20 MIN: CPT | Performed by: FAMILY MEDICINE

## 2019-08-19 RX ORDER — LISDEXAMFETAMINE DIMESYLATE 40 MG/1
CAPSULE ORAL
COMMUNITY
Start: 2019-07-08 | End: 2019-10-17

## 2019-08-19 NOTE — PATIENT INSTRUCTIONS
De Quervain Tenosynovitis  Tendons attach muscles to bones. They also help with joint movements. When tendons become irritated or swollen, it is called tendinitis.  The extensor pollicis brevis (EPB) tendon connects the EPB muscle to a bone that is near the base of the thumb. The EPB muscle helps to straighten and extend the thumb. De Quervain tenosynovitis is a condition in which the EPB tendon lining (sheath) becomes irritated, thickened, and swollen. This condition is sometimes called stenosing tenosynovitis. This condition causes pain on the thumb side of the back of the wrist.  What are the causes?  Causes of this condition include:  · Activities that repeatedly cause your thumb and wrist to extend.  · A sudden increase in activity or change in activity that affects your wrist.  What increases the risk?  This condition is more likely to develop in:  · Females.  · People who have diabetes.  · Women who have recently given birth.  · People who are over 40 years of age.  · People who do activities that involve repeated hand and wrist motions, such as tennis, racquetball, volleyball, gardening, and taking care of children.  · People who do heavy labor.  · People who have poor wrist strength and flexibility.  · People who do not warm up properly before activities.  What are the signs or symptoms?  Symptoms of this condition include:  · Pain or tenderness over the thumb side of the back of the wrist when your thumb and wrist are not moving.  · Pain that gets worse when you straighten your thumb or extend your thumb or wrist.  · Pain when the injured area is touched.  · Locking or catching of the thumb joint while you bend and straighten your thumb.  · Decreased thumb motion due to pain.  · Swelling over the affected area.  How is this diagnosed?  This condition is diagnosed with a medical history and physical exam. Your health care provider will ask for details about your injury and ask about your symptoms.  How is  this treated?  Treatment may include the use of icing and medicines to reduce pain and swelling. You may also be advised to wear a splint or brace to limit your thumb and wrist motion. In less severe cases, treatment may also include working with a physical therapist to strengthen your wrist and calm the irritation around your EPB tendon sheath. In severe cases, surgery may be needed.  Follow these instructions at home:  If you have a splint or brace:  · Wear it as told by your health care provider. Remove it only as told by your health care provider.  · Loosen the splint or brace if your fingers become numb and tingle, or if they turn cold and blue.  · Keep the splint or brace clean and dry.  Managing pain, stiffness, and swelling  · If directed, apply ice to the injured area.  ? Put ice in a plastic bag.  ? Place a towel between your skin and the bag.  ? Leave the ice on for 20 minutes, 2-3 times per day.  · Move your fingers often to avoid stiffness and to lessen swelling.  · Raise (elevate) the injured area above the level of your heart while you are sitting or lying down.  General instructions  · Return to your normal activities as told by your health care provider. Ask your health care provider what activities are safe for you.  · Take over-the-counter and prescription medicines only as told by your health care provider.  · Keep all follow-up visits as told by your health care provider. This is important.  · Do not drive or operate heavy machinery while taking prescription pain medicine.  Contact a health care provider if:  · Your pain, tenderness, or swelling gets worse, even if you have had treatment.  · You have numbness or tingling in your wrist, hand, or fingers on the injured side.  This information is not intended to replace advice given to you by your health care provider. Make sure you discuss any questions you have with your health care provider.  Document Released: 12/18/2006 Document Revised:  05/25/2017 Document Reviewed: 02/23/2016  [employer] Interactive Patient Education © 2019 [employer].

## 2019-08-19 NOTE — PROGRESS NOTES
Subjective   Herbie Gloria is a 29 y.o. female.     Wrist Pain    The pain is present in the right wrist. This is a new problem. The current episode started more than 1 month ago (she has been wearing a carpal tunnel splint. ). There has been no history of extremity trauma. The problem occurs constantly (right hand dom. ). 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   The quality of the pain is described as aching. The pain is at a severity of 3/10. The pain is mild. Pertinent negatives include no fever, inability to bear weight, joint locking, joint swelling, numbness or stiffness. The symptoms are aggravated by activity. She has tried NSAIDS, cold, heat and acetaminophen for the symptoms. The treatment provided no relief. Family history does not include gout or rheumatoid arthritis. There is no history of diabetes, gout, osteoarthritis or rheumatoid arthritis.        The following portions of the patient's history were reviewed and updated as appropriate: allergies, current medications, past family history, past medical history, past social history, past surgical history and problem list.    Review of Systems   Constitutional: Negative for fever.   Musculoskeletal: Negative for gout and stiffness.   Neurological: Negative for numbness.       Objective     Vitals:    08/19/19 1512   BP: 140/80   Pulse: (!) 131   Resp: 18   Temp: 97.1 °F (36.2 °C)   TempSrc: Temporal   SpO2: 98%   Weight: (!) 171 kg (378 lb)       Physical Exam   Constitutional: She appears well-developed and well-nourished.   Musculoskeletal: She exhibits tenderness. She exhibits no edema or deformity.   Tenderness with Finkelstein test negative Tinel negative Phalen's difficulty with abduction of the thumb pain with abduction of the thumb.   Nursing note and vitals reviewed.      Assessment/Plan     Problem List Items Addressed This Visit        Cardiovascular and Mediastinum    Hypertension     Current Assessment & Plan     Hypertension is improving with treatment.  Dietary sodium restriction.  Weight loss.  Ambulatory blood pressure monitoring.  Blood pressure will be reassessed at the next regular appointment.  bp well controlled at home.  Increase with office visits.             Musculoskeletal and Integument    De Quervain's tenosynovitis, right - Primary        I have given her a thumb spica splint today in the office.  If the symptoms not improving in the next 2 to 3 weeks notify the office and will refer her to Ortho hand.

## 2019-08-19 NOTE — ASSESSMENT & PLAN NOTE
Hypertension is improving with treatment.  Dietary sodium restriction.  Weight loss.  Ambulatory blood pressure monitoring.  Blood pressure will be reassessed at the next regular appointment.  bp well controlled at home.  Increase with office visits.

## 2019-09-18 DIAGNOSIS — M65.4 DE QUERVAIN'S TENOSYNOVITIS, RIGHT: Primary | ICD-10-CM

## 2019-10-17 ENCOUNTER — OFFICE VISIT (OUTPATIENT)
Dept: ORTHOPEDIC SURGERY | Facility: CLINIC | Age: 29
End: 2019-10-17

## 2019-10-17 VITALS — OXYGEN SATURATION: 98 % | HEIGHT: 69 IN | WEIGHT: 293 LBS | BODY MASS INDEX: 43.4 KG/M2 | HEART RATE: 127 BPM

## 2019-10-17 DIAGNOSIS — M65.4 DE QUERVAIN'S DISEASE (RADIAL STYLOID TENOSYNOVITIS): ICD-10-CM

## 2019-10-17 DIAGNOSIS — M25.531 RIGHT WRIST PAIN: Primary | ICD-10-CM

## 2019-10-17 PROCEDURE — 20550 NJX 1 TENDON SHEATH/LIGAMENT: CPT | Performed by: PHYSICIAN ASSISTANT

## 2019-10-17 PROCEDURE — 99204 OFFICE O/P NEW MOD 45 MIN: CPT | Performed by: PHYSICIAN ASSISTANT

## 2019-10-17 RX ORDER — LISDEXAMFETAMINE DIMESYLATE 50 MG
CAPSULE ORAL
COMMUNITY
Start: 2019-09-18

## 2019-10-17 RX ADMIN — BETAMETHASONE SODIUM PHOSPHATE AND BETAMETHASONE ACETATE 3 MG: 3; 3 INJECTION, SUSPENSION INTRA-ARTICULAR; INTRALESIONAL; INTRAMUSCULAR; SOFT TISSUE at 16:16

## 2019-10-17 RX ADMIN — LIDOCAINE HYDROCHLORIDE 0.5 ML: 10 INJECTION, SOLUTION EPIDURAL; INFILTRATION; INTRACAUDAL; PERINEURAL at 16:16

## 2019-10-17 NOTE — PROGRESS NOTES
[facility] Note    Subjective     Chief Complaint   Patient presents with   • Right Wrist - Pain        HPI  Herbie Gloria is a 29 y.o. female.  Right hand dominant.  Patient presents for evaluation of her right wrist/hand, along first dorsal compartment.  She is had pain since the middle of May to this area, no history of injury or trauma.  Treatment has included use of a thumb spica brace and ibuprofen.  She currently endorses a pain scale of 7/10.  Severity the pain moderate.  Quality pain dull, stabbing, shooting she feels her pain is worsening despite the thumb spica brace and use of anti-inflammatories.  Associated symptoms popping as well as a weak .  Resting her hand helps but any type of movement of the thumb or wrist causes exacerbation of pain.  No reported numbness or tingling into the extremity or digits.    Patient denies any fever, chills, night sweats or other constitutional symptoms.    Past Medical History:   Diagnosis Date   • Acute pharyngitis     streptococcal    • Asthmatic bronchitis    • Cough    • Sore throat       Past Surgical History:   Procedure Laterality Date   • NO PAST SURGERIES        Family History   Problem Relation Age of Onset   • Other Mother         High Blood Pressure    • Hypertension Mother    • Other Father         High Blood Pressure    • Hypertension Father    • Diabetes Paternal Grandmother      Social History     Socioeconomic History   • Marital status:      Spouse name: Not on file   • Number of children: Not on file   • Years of education: Not on file   • Highest education level: Not on file   Occupational History   • Occupation: works as adm special   Tobacco Use   • Smoking status: Never Smoker   • Smokeless tobacco: Never Used   Substance and Sexual Activity   • Alcohol use: Yes     Comment: social   • Drug use: No   • Sexual activity: Yes     Partners: Female     Comment:  to her wife      Current Outpatient  "Medications on File Prior to Visit   Medication Sig Dispense Refill   • albuterol 108 (90 Base) MCG/ACT inhaler Inhale 2 puffs Every 4 (Four) Hours As Needed for Wheezing. 1 inhaler 11   • VYVANSE 50 MG capsule        No current facility-administered medications on file prior to visit.       Allergies   Allergen Reactions   • Latex         The following portions of the patient's history were reviewed and updated as appropriate: allergies, current medications, past family history, past medical history, past social history, past surgical history and problem list.    Review of Systems   Constitutional: Negative.    HENT: Negative.    Eyes: Negative.    Respiratory: Negative.    Cardiovascular: Negative.    Gastrointestinal: Negative.    Endocrine: Negative.    Genitourinary: Negative.    Musculoskeletal: Positive for arthralgias.   Skin: Negative.    Allergic/Immunologic: Negative.    Neurological: Negative.    Hematological: Negative.    Psychiatric/Behavioral: Negative.         Objective      Physical Exam  Pulse (!) 127   Ht 175.3 cm (69.02\")   Wt (!) 171 kg (376 lb)   SpO2 98%   BMI 55.50 kg/m²     Body mass index is 55.5 kg/m².    GENERAL APPEARANCE: awake, alert & oriented x 3, in no acute distress and well developed, well nourished  PSYCH: normal mood and affect  LUNGS:  breathing nonlabored, no wheezing  EYES: sclera anicteric, pupils equal  CARDIOVASCULAR: palpable pulses dorsalis pedis, palpable posterior tibial bilaterally. Capillary refill less than 2 seconds  INTEGUMENTARY: skin intact, no clubbing, cyanosis  NEUROLOGIC:  Normal Sensation         Ortho Exam  Peripheral Vascular   Bilateral Upper Extremity    No cyanotic nail beds    Pink nail beds and rapid capillary refill   Palpation    Radial Pulse - Bilaterally normal    Neurologic   Sensory: Light touch intact- Right hand      Right Upper Extremity    Right wrist extensors: 5/5    Right wrist flexors: 5/5    Right intrinsics: " 5/5    Musculoskeletal     Inspection and Palpation   Right Wrist      Tenderness -positive tenderness along first dorsal compartment    Swelling - none    Crepitus - none    Muscle tone - no atrophy     Functional Testing:     Right Wrist and Thumb    Finklestein's: Positive    CMC shuck: Negative    CMC grind: Negative    CMC tenderness: Negative    A1 pulley: Negative       Strength and Tone    Right  strength: good    Left  strength: good     Hand Exam/Etc: Decreased range of motion of the thumb secondary to pain and stiffness.  Limited thumb flexion and opposition.      Imaging/Studies  Ordered right wrist plain films.  Imaging read by Dr. Velez.    Imaging Results (last 7 days)     Procedure Component Value Units Date/Time    XR Wrist 3+ View Right [[id number]] Resulted:  10/17/19 1642     Updated:  10/17/19 1642    Narrative:       Indication: Right wrist pain    Comparison: No prior xrays are available for comparison      Impression:            Right wrist 3 views: Radiographs demonstrate no acute bony abnormality or   fracture.  Osseous structures appear normal anatomic alignment.          Assessment/Plan        ICD-10-CM ICD-9-CM   1. Right wrist pain M25.531 719.43   2. De Quervain's disease (radial styloid tenosynovitis) M65.4 727.04       Orders Placed This Encounter   Procedures   • Small Joint Arthrocentesis: R thumb CMC   • XR Wrist 3+ View Right   • Ambulatory Referral to Occupational Therapy        -Right wrist pain due to De Quervain's/radial styloid tenosynovitis.    -Patient has remained unresponsive to conservative management to include bracing, anti-inflammatories and work modifications (trying to avoid repetitive motion).  These treatments have not worked for her.  -Offered and accepted a corticosteroid injection to the first dorsal compartment.  Injection was given today.  -Continue use of thumb spica splint for the next few days, until steroid kicks in.  -Continue use of ibuprofen  as needed.  -Ordered formal OT.  -Follow-up in 6 weeks for repeat evaluation, sooner if issues arise or symptoms worsen/change.  -Questions and concerns answered.    After discussing the risks, benefits, indications of injection, the patient gave consent to proceed.  Her right first dorsal compartment was confirmed as the correct site to be injected with a timeout.  It was then prepped using Hibiclens and injected with a mixture of 1/2 cc of 1% plain lidocaine and 1/2 cc of Celestone, without any resistance into the first dorsal compartment, patient in seated position.  Area was cleaned, hemostasis was achieved and a Band-Aid was applied over the injection site.  The patient tolerated procedure well.  I instructed the patient on signs and symptoms of infection.  They should report to the emergency department or return to clinic if any of these develop, for further evaluation and treatment.  Recommended modifying activity for the next 48 hours to include rest, ice, elevation and oral pain medication as needed.      Medical Decision Making  Management Options : over-the-counter medicine, prescription/IM medicine and physical/occupational therapy  Data/Risk: radiology tests       Millicent Ilsas PA-C  10/18/19  3:25 PM         EMR Dragon/Transcription disclaimer:  Much of this encounter note is an electronic transcription of spoken language to printed text. Electronic transcription of spoken language may permit erroneous, or at times, nonsensical words or phrases to be inadvertently transcribed. Although I have reviewed the note for such errors, some may still exist.

## 2019-10-17 NOTE — PROGRESS NOTES
Procedure   Small Joint Arthrocentesis: R thumb CMC  Consent given by: patient  Site marked: site marked  Timeout: Immediately prior to procedure a time out was called to verify the correct patient, procedure, equipment, support staff and site/side marked as required   Supporting Documentation  Indications: pain   Procedure Details  Location: thumb - R thumb CMC  Preparation: Patient was prepped and draped in the usual sterile fashion  Needle size: 25 G  Approach: dorsal  Medications administered: 0.5 mL lidocaine PF 1% 1 %; 3 mg betamethasone acetate-betamethasone sodium phosphate 6 (3-3) MG/ML  Patient tolerance: patient tolerated the procedure well with no immediate complications

## 2019-10-18 RX ORDER — LIDOCAINE HYDROCHLORIDE 10 MG/ML
0.5 INJECTION, SOLUTION EPIDURAL; INFILTRATION; INTRACAUDAL; PERINEURAL
Status: COMPLETED | OUTPATIENT
Start: 2019-10-17 | End: 2019-10-17

## 2019-10-18 RX ORDER — BETAMETHASONE SODIUM PHOSPHATE AND BETAMETHASONE ACETATE 3; 3 MG/ML; MG/ML
3 INJECTION, SUSPENSION INTRA-ARTICULAR; INTRALESIONAL; INTRAMUSCULAR; SOFT TISSUE
Status: COMPLETED | OUTPATIENT
Start: 2019-10-17 | End: 2019-10-17

## 2019-12-05 ENCOUNTER — OFFICE VISIT (OUTPATIENT)
Dept: ORTHOPEDIC SURGERY | Facility: CLINIC | Age: 29
End: 2019-12-05

## 2019-12-05 VITALS — OXYGEN SATURATION: 99 % | WEIGHT: 293 LBS | HEIGHT: 69 IN | BODY MASS INDEX: 43.4 KG/M2 | HEART RATE: 113 BPM

## 2019-12-05 DIAGNOSIS — M65.4 DE QUERVAIN'S DISEASE (RADIAL STYLOID TENOSYNOVITIS): ICD-10-CM

## 2019-12-05 DIAGNOSIS — M25.531 RIGHT WRIST PAIN: Primary | ICD-10-CM

## 2019-12-05 PROCEDURE — 99213 OFFICE O/P EST LOW 20 MIN: CPT | Performed by: PHYSICIAN ASSISTANT

## 2019-12-05 RX ORDER — GUANFACINE 1 MG/1
TABLET, EXTENDED RELEASE ORAL
COMMUNITY
Start: 2019-11-13

## 2019-12-05 NOTE — PROGRESS NOTES
"    [facility] [place] Orthopaedic Surgery Clinic Note    Subjective     CC: Follow-up (7 weeks- De Quervain's disease, Right wrist)      HPI    Herbie Gloria is a 29 y.o. female.  Patient returns for follow-up evaluation of her right wrist, radial aspect.  She is being treated for de Quervain's tenosynovitis.  On 10/17/2019 she was provided first dorsal compartment corticosteroid injection which did help improve her symptoms at least until recently.  Occupational therapy is also been helping until recently.  Now she is had an exacerbation of her pain with significant stiffness to the thumb including inability to flex specifically at the MCP.  Currently she endorses a pain scale at 2/10.  Severity the pain mild to moderate.  Quality the pain dull, aching.  She is wearing a fabricated splint.  No reported numbness or tingling into the extremity or digits.      ROS:    Constiutional:Pt denies fever, chills, nausea, or vomiting.  MSK:as above    Objective      Past Medical History  Past Medical History:   Diagnosis Date   • Acute pharyngitis     streptococcal    • Asthmatic bronchitis    • Cough    • Sore throat          Physical Exam  Pulse 113   Ht 175.3 cm (69.02\")   Wt (!) 171 kg (376 lb 15.8 oz)   SpO2 99%   BMI 55.65 kg/m²     Body mass index is 55.65 kg/m².    Patient is well nourished and well developed.        Ortho Exam  Right wrist/thumb  Skin: Grossly intact without redness or warmth.  Mild soft tissue is appreciable.  Tenderness: Positive along the first dorsal compartment.  Finkelstein's test positive.  Motion decreased to the thumb especially at the MCP.      Imaging/Labs/EMG Reviewed:  No new imaging today.      Assessment:  1. Right wrist pain    2. De Quervain's disease (radial styloid tenosynovitis)        Plan:  1. Right wrist pain due to De Quervain's tenosynovitis that initially was responding well to therapy had a corticosteroid injection only to have pain began to worsen and significant stiffness noted " to the thumb.  2. Proceed with MRI for further evaluation.  3. Recommend over-the-counter pain medication as needed.  4. Follow-up after MRI completed to discuss results and further treatment options.  5. Questions and concerns answered.      Millicent Islas PA-C  12/09/19  11:54 AM

## 2019-12-16 ENCOUNTER — HOSPITAL ENCOUNTER (OUTPATIENT)
Dept: MRI IMAGING | Facility: HOSPITAL | Age: 29
Discharge: HOME | End: 2019-12-16
Admitting: PHYSICIAN ASSISTANT

## 2019-12-16 DIAGNOSIS — M65.4 DE QUERVAIN'S DISEASE (RADIAL STYLOID TENOSYNOVITIS): ICD-10-CM

## 2019-12-16 DIAGNOSIS — M25.531 RIGHT WRIST PAIN: ICD-10-CM

## 2019-12-16 PROCEDURE — 73221 MRI JOINT UPR EXTREM W/O DYE: CPT

## 2019-12-19 ENCOUNTER — OFFICE VISIT (OUTPATIENT)
Dept: ORTHOPEDIC SURGERY | Facility: CLINIC | Age: 29
End: 2019-12-19

## 2019-12-19 VITALS — OXYGEN SATURATION: 98 % | WEIGHT: 293 LBS | HEART RATE: 112 BPM | BODY MASS INDEX: 43.4 KG/M2 | HEIGHT: 69 IN

## 2019-12-19 DIAGNOSIS — M65.4 DE QUERVAIN'S DISEASE (RADIAL STYLOID TENOSYNOVITIS): Primary | ICD-10-CM

## 2019-12-19 PROCEDURE — 99213 OFFICE O/P EST LOW 20 MIN: CPT | Performed by: PHYSICIAN ASSISTANT

## 2019-12-19 NOTE — PROGRESS NOTES
"    [facility] [place] Orthopaedic Surgery Clinic Note        Subjective     CC: Follow-up of the Right Wrist (After MRI 12/16/2019)      HPI    Herbie Wong is a 29 y.o. female.  Patient returns today for MRI follow-up right wrist.  She continues to note pain along first dorsal compartment.  Pain scale 3/10.  Swelling and stiffness noted.  She is working with [facility].    ROS:    Constiutional:Pt denies fever, chills, nausea, or vomiting.  MSK:as above        Objective      Past Medical History  Past Medical History:   Diagnosis Date   • Acute pharyngitis     streptococcal    • Asthmatic bronchitis    • Cough    • Sore throat          Physical Exam  Pulse 112   Ht 175.3 cm (69.02\")   Wt (!) 172 kg (379 lb 3.1 oz)   SpO2 98%   BMI 55.97 kg/m²     Body mass index is 55.97 kg/m².    Patient is well nourished and well developed.        Ortho Exam  No change in exam from 12/5/2019.      Imaging/Labs/EMG Reviewed:  Reviewed MRI performed on 12/16/2019.    MRI Wrist RT WO     INDICATION:    Radial sided wrist pain near base of thumb since May 2019. Steroid injection in October. Suspicious for a De Quervain's tenosynovitis.     TECHNIQUE:   MRI of the right wrist without IV contrast.     COMPARISON:    None available.     FINDINGS:  Normal carpal alignment. No focal marrow edema. Benign cyst proximal capitate. Joint spaces appear maintained without significant arthrosis. The scapholunate and lunatotriquetral ligaments appear intact. The triangular fibrocartilage complex appears  intact. The extrinsic wrist ligaments appear normal.     Mild tendinosis and tenosynovitis extensor pollicis brevis tendon. The abductor pollicis longus tendon appears intact without significant tendinosis or tear. Small amount of fluid along the extensor carpi radialis brevis and longus tendons nonspecific.  The remainder of the extensor tendons appear intact. The flexor tendons appear normal. Normal-appearing carpal tunnel and " Guyon's canal. Median and ulnar nerves appear normal.     6 x 6 mm multiloculated cyst along the volar aspect of the wrist at the level of the distal radius. This most likely represents a small ganglion cyst within the radioscapholunate ligament.     IMPRESSION:  Mild tendinosis extensor pollicis brevis tendon with some associated tenosynovitis closely corresponding to the area of maximum pain as marked by gel capsule. There is mild attenuation of the extensor pollicis brevis but no definitive tear. Abductor  pollicis longus tendon unremarkable.     Signer Name: HERNANDEZ Marie MD   Signed: 12/17/2019 12:56 PM   Workstation Name: [id number]    [facility]       Assessment:  1. De Quervain's disease (radial styloid tenosynovitis)    2. BMI 50.0-59.9, adult (CMS/[facility])        Plan:  1. Right De Quervain's tenosynovitis which responded well to corticosteroid injection but symptoms have returned.  2. Based on patient's BMI she is not a surgical candidate at this time.  When she gets her BMI under 50 with a goal weight of 338 then she can be reassessed for surgical intervention.  3. Patient has a BMI of 56 which needs to be watched.  Briefly discussed weight loss and nutrition.    4. Continue with therapy and splint provided by [facility].  5. Recommend over-the-counter pain medication as needed.  6. Follow-up 4 months for repeat evaluation.  At that time we will reassess her weight and if she still symptomatic we can discuss surgical intervention if her BMI is under 50.  If her BMI is still greater than 50 we can consider repeat corticosteroid injection.  7. Questions and concerns answered.      Millicent Islas PA-C  12/20/19  12:25 PM

## 2020-02-13 ENCOUNTER — TELEPHONE (OUTPATIENT)
Dept: ORTHOPEDIC SURGERY | Facility: CLINIC | Age: 30
End: 2020-02-13

## 2020-02-13 NOTE — TELEPHONE ENCOUNTER
Via [employer]:    Regarding: Visit Follow-Up Question  Contact: [phone]  PT is not helping and I am in a lot of pain. I am trying to lose weight as you instructed, but can you please explain again what goal I am trying to hit and whether surgery will be possible? I don’t know how much more of this pain I can stand.   You did not show the MRI results to me so I’m not certain but I think you said there was some thinning to my tendon and a buildup of fluid and I think I may have torn something yesterday.

## 2020-02-13 NOTE — TELEPHONE ENCOUNTER
Patient's goal weight is 338 pounds.  This would put her at a BMI of 50.  Once she hits a BMI of 50 then yes we can proceed with surgical intervention which would be a first dorsal compartment release.    MRI showed mild tendinosis/tenosynovitis to the extensor pollicis brevis tendon which is located where she is having her pain.  There is no evidence of a tear but attenuation/thinning noted to that tendon.    If she would like I can refer her to Dr. Zhang (hand surgeon at ) for further evaluation and treatment.  I do not know what there requirements are in regards to BMI being under a certain number before proceeding with surgery.

## 2020-02-19 ENCOUNTER — TELEPHONE (OUTPATIENT)
Dept: ORTHOPEDIC SURGERY | Facility: CLINIC | Age: 30
End: 2020-02-19

## 2020-02-19 DIAGNOSIS — M65.4 DE QUERVAIN'S DISEASE (RADIAL STYLOID TENOSYNOVITIS): ICD-10-CM

## 2020-02-19 NOTE — TELEPHONE ENCOUNTER
"Millicent please put in referral.    Ruth          ----- Message from Herbie Wong sent at 2/18/2020  5:13 PM EST -----  Regarding: RE: Visit Follow-Up Question  Contact: [phone]      Thank you. I appreciate the quick response! That would be great if you could send that referral in, please.          ----- Message -----  From: YAMILET BELL  Sent: 2/14/2020  8:03 AM EST  To: Herbie Wong  Subject: RE: Visit Follow-Up Question  Jurgen Wong,    I sent your message directly to Millicent and her response was:    \"Patient's goal weight is 338 pounds.  This would put her at a BMI of 50.  Once she hits a BMI of 50 then yes we can proceed with surgical intervention which would be a first dorsal compartment release.     MRI showed mild tendinosis/tenosynovitis to the extensor pollicis brevis tendon which is located where she is having her pain.  There is no evidence of a tear but attenuation/thinning noted to that tendon.     If she would like I can refer her to Dr. Zhang (hand surgeon at ) for further evaluation and treatment.  I do not know what there requirements are in regards to BMI being under a certain number before proceeding with surgery.\"    Thank you,    Maria Elena BELL               ----- Message -----     From: Herbie Wong     Sent: 2/13/2020  3:03 PM EST       To: Millicent Islas PA-C  Subject: Visit Follow-Up Question    PT is not helping and I am in a lot of pain. I am trying to lose weight as you instructed, but can you please explain again what goal I am trying to hit and whether surgery will be possible? I don’t know how much more of this pain I can stand.   You did not show the MRI results to me so I’m not certain but I think you said there was some thinning to my tendon and a buildup of fluid and I think I may have torn something yesterday.      "

## 2020-02-19 NOTE — TELEPHONE ENCOUNTER
Sent patient message thru MyChart letting her know that they should be contacting her within the next week.  Ruth

## 2020-04-06 DIAGNOSIS — J01.80 OTHER ACUTE SINUSITIS, RECURRENCE NOT SPECIFIED: ICD-10-CM

## 2020-04-06 DIAGNOSIS — R52 GENERALIZED BODY ACHES: ICD-10-CM

## 2020-04-07 RX ORDER — ALBUTEROL SULFATE 90 UG/1
2 AEROSOL, METERED RESPIRATORY (INHALATION) EVERY 4 HOURS PRN
Qty: 1 INHALER | Refills: 2 | Status: SHIPPED | OUTPATIENT
Start: 2020-04-07 | End: 2021-05-24 | Stop reason: SDUPTHER

## 2021-05-24 DIAGNOSIS — R52 GENERALIZED BODY ACHES: ICD-10-CM

## 2021-05-24 DIAGNOSIS — J01.80 OTHER ACUTE SINUSITIS, RECURRENCE NOT SPECIFIED: ICD-10-CM

## 2021-05-24 RX ORDER — ALBUTEROL SULFATE 90 UG/1
2 AEROSOL, METERED RESPIRATORY (INHALATION) EVERY 4 HOURS PRN
Qty: 18 G | Refills: 3 | Status: SHIPPED | OUTPATIENT
Start: 2021-05-24

## 2023-10-09 ENCOUNTER — OFFICE VISIT (OUTPATIENT)
Dept: FAMILY MEDICINE CLINIC | Facility: CLINIC | Age: 33
End: 2023-10-09
Payer: COMMERCIAL

## 2023-10-09 VITALS
WEIGHT: 293 LBS | SYSTOLIC BLOOD PRESSURE: 132 MMHG | BODY MASS INDEX: 41.95 KG/M2 | TEMPERATURE: 98.6 F | OXYGEN SATURATION: 98 % | DIASTOLIC BLOOD PRESSURE: 96 MMHG | HEART RATE: 147 BPM | HEIGHT: 70 IN

## 2023-10-09 DIAGNOSIS — E53.8 FOLATE DEFICIENCY: ICD-10-CM

## 2023-10-09 DIAGNOSIS — Z12.4 CERVICAL CANCER SCREENING: ICD-10-CM

## 2023-10-09 DIAGNOSIS — G89.29 CHRONIC RIGHT-SIDED LOW BACK PAIN WITHOUT SCIATICA: ICD-10-CM

## 2023-10-09 DIAGNOSIS — E55.9 VITAMIN D DEFICIENCY: ICD-10-CM

## 2023-10-09 DIAGNOSIS — Z83.3 FAMILY HISTORY OF DIABETES MELLITUS: ICD-10-CM

## 2023-10-09 DIAGNOSIS — Z13.29 SCREENING FOR HYPOTHYROIDISM: ICD-10-CM

## 2023-10-09 DIAGNOSIS — Z76.89 ENCOUNTER TO ESTABLISH CARE WITH NEW DOCTOR: ICD-10-CM

## 2023-10-09 DIAGNOSIS — I10 HYPERTENSION, UNSPECIFIED TYPE: Primary | ICD-10-CM

## 2023-10-09 DIAGNOSIS — M54.50 CHRONIC RIGHT-SIDED LOW BACK PAIN WITHOUT SCIATICA: ICD-10-CM

## 2023-10-09 DIAGNOSIS — M62.830 LUMBAR PARASPINAL MUSCLE SPASM: ICD-10-CM

## 2023-10-09 DIAGNOSIS — Z13.220 SCREENING FOR HYPERLIPIDEMIA: ICD-10-CM

## 2023-10-09 PROCEDURE — 99204 OFFICE O/P NEW MOD 45 MIN: CPT | Performed by: FAMILY MEDICINE

## 2023-10-09 RX ORDER — LISINOPRIL 10 MG/1
10 TABLET ORAL DAILY
Qty: 90 TABLET | Refills: 1 | Status: SHIPPED | OUTPATIENT
Start: 2023-10-09

## 2023-10-09 RX ORDER — IBUPROFEN 600 MG/1
600 TABLET ORAL EVERY 8 HOURS PRN
Qty: 60 TABLET | Refills: 0 | Status: SHIPPED | OUTPATIENT
Start: 2023-10-09

## 2023-10-09 RX ORDER — CYCLOBENZAPRINE HCL 10 MG
10 TABLET ORAL NIGHTLY PRN
Qty: 30 TABLET | Refills: 0 | Status: SHIPPED | OUTPATIENT
Start: 2023-10-09

## 2023-10-09 RX ORDER — FLUOXETINE HYDROCHLORIDE 40 MG/1
CAPSULE ORAL
COMMUNITY
Start: 2023-07-06

## 2023-10-09 RX ORDER — FLUOXETINE HYDROCHLORIDE 20 MG/1
CAPSULE ORAL
COMMUNITY
Start: 2023-07-06

## 2023-10-09 NOTE — PATIENT INSTRUCTIONS
Lisinopril daily.  Flexeril as ordered at bedtime.  Don't drive while taking Flexeril.  Can use muscle rubs up to 4 times a day.  Can use heating pad up to 20 minutes at a time.  XR lumbar soon.  Labs soon-fasting.

## 2023-10-09 NOTE — PROGRESS NOTES
"    Follow Up Office Visit      Date: 10/09/2023   Patient Name: Herbie Wong  : 1990   MRN: [id number]     Chief Complaint:    Chief Complaint   Patient presents with    Back Pain       History of Present Illness: Herbie Wong is a 33 y.o. female who presents today for evaluation of blood pressure issues and pain issues.  Patient is listed with Dr. Plascencia as PCP.    With Mariposa in the room, patient's wife.    Reports was with Dr. Plascencia for some time.  Has not been seen for some time.  Reports since COVID, she was not following up.    Reports bp has been running high at home.    128/88, 135/91  144/86    Takes Vyvanse, and Intuniv for ADHD.  Prescribed by [facility]. -Magdaleno Seymour.      She is also prescribed Prozac 40 and 20 mg.      Patient is new patient to me.    No blood pressure medications listed on chart.      Per chart Intuniv, and Vyvanse listed.  Per PDMP report Vyvanse was prescribed by Magdaleno Dixon.  Prozac on chart as well.    Patient has been seen by Ortho for de Quervain's tenosynovitis.  Has had MRI right wrist and x-ray wrist 3 2019        Reports she has sciatic nerve pain  in right and right lumbar pain.   Reports she pain radiates to posterior right hip area and some to the thigh.  Reports pain is pretty contstant.  Reports intensity changes.    Reports was seen at  10/1 and had steroid injection.  \"[facility]ger[facility]\" on [facility].  May have had Toradol injection as well.      Reports back has been bothering her for 2-3 weeks.  Off and on for 6 months.  Standing too long was previously bringing it on.  Denies recent fall.  Denies loss of bladder or bowel function.  Reports pain is ache but with certain movments can be more sharp.  Now 3/10.    Has tried muscle rubs and heating pad in the past.    Was seen [facility] area.  Reports she was not treated due to her blood pressure.    Takes Excedrine and " "Tylenol at times for pain.                Subjective      Review of Systems:   Review of Systems   Constitutional:  Negative for chills and fever.   Gastrointestinal:  Negative for nausea and vomiting.   Musculoskeletal:  Positive for back pain.   Neurological:  Negative for seizures and syncope.       I have reviewed the patients family history, social history, past medical history, past surgical history and have updated it as appropriate.     Medications:     Current Outpatient Medications:     albuterol sulfate  (90 Base) MCG/ACT inhaler, Inhale 2 puffs Every 4 (Four) Hours As Needed for Wheezing., Disp: 18 g, Rfl: 3    FLUoxetine (PROzac) 20 MG capsule, , Disp: , Rfl:     FLUoxetine (PROzac) 40 MG capsule, , Disp: , Rfl:     guanFACINE HCl ER (INTUNIV) 1 MG tablet sustained-release 24 hour, , Disp: , Rfl:     VYVANSE 50 MG capsule, , Disp: , Rfl:     cyclobenzaprine (FLEXERIL) 10 MG tablet, Take 1 tablet by mouth At Night As Needed for Muscle Spasms., Disp: 30 tablet, Rfl: 0    ibuprofen (ADVIL,MOTRIN) 600 MG tablet, Take 1 tablet by mouth Every 8 (Eight) Hours As Needed for Mild Pain., Disp: 60 tablet, Rfl: 0    lisinopril (PRINIVIL,ZESTRIL) 10 MG tablet, Take 1 tablet by mouth Daily., Disp: 90 tablet, Rfl: 1    Allergies:   Allergies   Allergen Reactions    Latex        Objective     Physical Exam: Please see above  Vital Signs:   Vitals:    10/09/23 1540   BP: 132/96   BP Location: Right arm   Patient Position: Sitting   Cuff Size: Large Adult   Pulse: (!) 147   Temp: 98.6 øF (37 øC)   TempSrc: Infrared   SpO2: 98%   Weight: (!) 171 kg (378 lb)   Height: 177.8 cm (70\")   PainSc:   4   PainLoc: Back     Body mass index is 54.24 kg/mý.  BMI cannot be calculated due to outdated height or weight values.  Please input a current height/weight in Vitals and re-renter BMIFOLLOWUP in Note to pull in correct documentation based on BMI range.           Physical Exam  Vitals and nursing note reviewed. "   Constitutional:       Appearance: Normal appearance.   HENT:      Head: Normocephalic and atraumatic.   Neck:      Vascular: No carotid bruit.   Cardiovascular:      Rate and Rhythm: Normal rate and regular rhythm.      Heart sounds: Normal heart sounds. No murmur heard.  Pulmonary:      Effort: Pulmonary effort is normal.      Breath sounds: Normal breath sounds.   Abdominal:      General: Bowel sounds are normal.      Palpations: Abdomen is soft. There is no mass.      Tenderness: There is no abdominal tenderness.   Musculoskeletal:      Cervical back: Neck supple.      Right lower leg: No edema.      Left lower leg: No edema.   Skin:     Coloration: Skin is not jaundiced or pale.      Findings: No erythema.   Neurological:      Mental Status: She is alert. Mental status is at baseline.   Psychiatric:         Mood and Affect: Mood normal.         Behavior: Behavior normal.         Procedures    Results:   Labs:   TSH   Date Value Ref Range Status   01/11/2019 2.398 0.350 - 5.350 mIU/mL Final        POCT Results (if applicable):   Results for orders placed or performed in visit on 01/16/19   POCT rapid strep A    Specimen: Swab   Result Value Ref Range    Rapid Strep A Screen Negative Negative, VALID, INVALID, Not Performed    Internal Control Passed Passed    Lot Number [id number]     Expiration Date 03-    POCT Influenza A/B    Specimen: Swab   Result Value Ref Range    Rapid Influenza A Ag Negative Negative    Rapid Influenza B Ag Negative Negative    Internal Control Passed Passed    Lot Number [id number]     Expiration Date 09-        Imaging:   No valid procedures specified.     Measures:   Advanced Care Planning:   Patient does not have an advance directive, information provided.    Smoking Cessation:   Does not smoke      Assessment / Plan      Assessment/Plan:     1. Hypertension, unspecified type  Discussed importance of following low-salt diet for blood pressure control.  Start lisinopril  10 mg daily.  - lisinopril (PRINIVIL,ZESTRIL) 10 MG tablet; Take 1 tablet by mouth Daily.  Dispense: 90 tablet; Refill: 1    2. Screening for hyperlipidemia  Fasting lipid panel soon.  - Lipid Panel; Future    3. Chronic right-sided low back pain without sciatica  We will get x-ray lumbar spine.  Discussed with patient she may have to return for x-ray as x-ray technician may be gone for the day.  We will start ibuprofen 600 mg up to 3 times daily as needed.    - XR Spine Lumbar 2 or 3 View (In Office)  - ibuprofen (ADVIL,MOTRIN) 600 MG tablet; Take 1 tablet by mouth Every 8 (Eight) Hours As Needed for Mild Pain.  Dispense: 60 tablet; Refill: 0    4. Lumbar paraspinal muscle spasm  Flexeril 10 mg at bedtime.  Discussed with patient that she may need to take 5 mg if 10 mg is too strong.  - cyclobenzaprine (FLEXERIL) 10 MG tablet; Take 1 tablet by mouth At Night As Needed for Muscle Spasms.  Dispense: 30 tablet; Refill: 0    5. Vitamin D deficiency  Check vitamin D.  Patient with history of vitamin D deficiency and has not been taking vitamin D.    - Vitamin D,25-Hydroxy; Future    6. Folate deficiency  Check folate and B12.  - Folate; Future  - Vitamin B12; Future    7. Cervical cancer screening  Referral to gynecology as requested.  - Ambulatory Referral to Gynecology    8. Screening for hypothyroidism  Check TSH.  - TSH; Future    9. Family history of diabetes mellitus  Check A1c.  - Hemoglobin A1c; Future    10. Encounter to establish care with new doctor  Check CBC, CMP.    - Comprehensive Metabolic Panel; Future  - CBC & Differential; Future    11. BMI 50.0-59.9, adult  Encourage patient to watch portion control and make good food choices.  Also encouraged regular exercise with goal being 30 minutes of exercise at a time most days of the week.        Part of this note may be an electronic transcription/translation of spoken language to printed text using the Dragon Dictation System.        Vaccine  Counseling:      Follow Up:   Return in about 4 weeks (around 11/6/2023).      DO MAXIMILIAN York

## 2023-10-10 ENCOUNTER — LAB (OUTPATIENT)
Dept: LAB | Facility: HOSPITAL | Age: 33
End: 2023-10-10
Payer: COMMERCIAL

## 2023-10-10 DIAGNOSIS — Z76.89 ENCOUNTER TO ESTABLISH CARE WITH NEW DOCTOR: ICD-10-CM

## 2023-10-10 DIAGNOSIS — Z83.3 FAMILY HISTORY OF DIABETES MELLITUS: ICD-10-CM

## 2023-10-10 DIAGNOSIS — E55.9 VITAMIN D DEFICIENCY: ICD-10-CM

## 2023-10-10 DIAGNOSIS — Z13.29 SCREENING FOR HYPOTHYROIDISM: ICD-10-CM

## 2023-10-10 DIAGNOSIS — E53.8 FOLATE DEFICIENCY: ICD-10-CM

## 2023-10-10 DIAGNOSIS — Z13.220 SCREENING FOR HYPERLIPIDEMIA: ICD-10-CM

## 2023-10-10 LAB
25(OH)D3 SERPL-MCNC: 12.2 NG/ML (ref 30–100)
ALBUMIN SERPL-MCNC: 4.1 G/DL (ref 3.5–5.2)
ALBUMIN/GLOB SERPL: 1.1 G/DL
ALP SERPL-CCNC: 64 U/L (ref 39–117)
ALT SERPL W P-5'-P-CCNC: 56 U/L (ref 1–33)
ANION GAP SERPL CALCULATED.3IONS-SCNC: 12 MMOL/L (ref 5–15)
AST SERPL-CCNC: 26 U/L (ref 1–32)
BASOPHILS # BLD AUTO: 0.07 10*3/MM3 (ref 0–0.2)
BASOPHILS NFR BLD AUTO: 0.7 % (ref 0–1.5)
BILIRUB SERPL-MCNC: 0.9 MG/DL (ref 0–1.2)
BUN SERPL-MCNC: 24 MG/DL (ref 6–20)
BUN/CREAT SERPL: 26.1 (ref 7–25)
CALCIUM SPEC-SCNC: 9.9 MG/DL (ref 8.6–10.5)
CHLORIDE SERPL-SCNC: 103 MMOL/L (ref 98–107)
CHOLEST SERPL-MCNC: 174 MG/DL (ref 0–200)
CO2 SERPL-SCNC: 26 MMOL/L (ref 22–29)
CREAT SERPL-MCNC: 0.92 MG/DL (ref 0.57–1)
DEPRECATED RDW RBC AUTO: 40.5 FL (ref 37–54)
EGFRCR SERPLBLD CKD-EPI 2021: 84 ML/MIN/1.73
EOSINOPHIL # BLD AUTO: 0.3 10*3/MM3 (ref 0–0.4)
EOSINOPHIL NFR BLD AUTO: 2.8 % (ref 0.3–6.2)
ERYTHROCYTE [DISTWIDTH] IN BLOOD BY AUTOMATED COUNT: 13.2 % (ref 12.3–15.4)
FOLATE SERPL-MCNC: 11.3 NG/ML (ref 4.78–24.2)
GLOBULIN UR ELPH-MCNC: 3.9 GM/DL
GLUCOSE SERPL-MCNC: 129 MG/DL (ref 65–99)
HBA1C MFR BLD: 6.3 % (ref 4.8–5.6)
HCT VFR BLD AUTO: 41.3 % (ref 34–46.6)
HDLC SERPL-MCNC: 54 MG/DL (ref 40–60)
HGB BLD-MCNC: 13.6 G/DL (ref 12–15.9)
IMM GRANULOCYTES # BLD AUTO: 0.04 10*3/MM3 (ref 0–0.05)
IMM GRANULOCYTES NFR BLD AUTO: 0.4 % (ref 0–0.5)
LDLC SERPL CALC-MCNC: 102 MG/DL (ref 0–100)
LDLC/HDLC SERPL: 1.85 {RATIO}
LYMPHOCYTES # BLD AUTO: 3.46 10*3/MM3 (ref 0.7–3.1)
LYMPHOCYTES NFR BLD AUTO: 32.2 % (ref 19.6–45.3)
MCH RBC QN AUTO: 27.9 PG (ref 26.6–33)
MCHC RBC AUTO-ENTMCNC: 32.9 G/DL (ref 31.5–35.7)
MCV RBC AUTO: 84.8 FL (ref 79–97)
MONOCYTES # BLD AUTO: 0.64 10*3/MM3 (ref 0.1–0.9)
MONOCYTES NFR BLD AUTO: 6 % (ref 5–12)
NEUTROPHILS NFR BLD AUTO: 57.9 % (ref 42.7–76)
NEUTROPHILS NFR BLD AUTO: 6.24 10*3/MM3 (ref 1.7–7)
NRBC BLD AUTO-RTO: 0 /100 WBC (ref 0–0.2)
PLATELET # BLD AUTO: 276 10*3/MM3 (ref 140–450)
PMV BLD AUTO: 12.3 FL (ref 6–12)
POTASSIUM SERPL-SCNC: 4.5 MMOL/L (ref 3.5–5.2)
PROT SERPL-MCNC: 8 G/DL (ref 6–8.5)
RBC # BLD AUTO: 4.87 10*6/MM3 (ref 3.77–5.28)
SODIUM SERPL-SCNC: 141 MMOL/L (ref 136–145)
TRIGL SERPL-MCNC: 100 MG/DL (ref 0–150)
TSH SERPL DL<=0.05 MIU/L-ACNC: 5.14 UIU/ML (ref 0.27–4.2)
VIT B12 BLD-MCNC: 364 PG/ML (ref 211–946)
VLDLC SERPL-MCNC: 18 MG/DL (ref 5–40)
WBC NRBC COR # BLD: 10.75 10*3/MM3 (ref 3.4–10.8)

## 2023-10-10 PROCEDURE — 80050 GENERAL HEALTH PANEL: CPT

## 2023-10-10 PROCEDURE — 82746 ASSAY OF FOLIC ACID SERUM: CPT

## 2023-10-10 PROCEDURE — 80061 LIPID PANEL: CPT

## 2023-10-10 PROCEDURE — 82607 VITAMIN B-12: CPT

## 2023-10-10 PROCEDURE — 83036 HEMOGLOBIN GLYCOSYLATED A1C: CPT

## 2023-10-10 PROCEDURE — 82306 VITAMIN D 25 HYDROXY: CPT

## 2023-10-11 ENCOUNTER — PATIENT ROUNDING (BHMG ONLY) (OUTPATIENT)
Dept: FAMILY MEDICINE CLINIC | Facility: CLINIC | Age: 33
End: 2023-10-11
Payer: COMMERCIAL

## 2023-10-11 ENCOUNTER — TELEPHONE (OUTPATIENT)
Dept: FAMILY MEDICINE CLINIC | Facility: CLINIC | Age: 33
End: 2023-10-11
Payer: COMMERCIAL

## 2023-10-11 PROBLEM — M51.369 DDD (DEGENERATIVE DISC DISEASE), LUMBAR: Status: ACTIVE | Noted: 2023-10-11

## 2023-10-11 PROBLEM — M51.36 DDD (DEGENERATIVE DISC DISEASE), LUMBAR: Status: ACTIVE | Noted: 2023-10-11

## 2023-10-11 NOTE — TELEPHONE ENCOUNTER
HUB TO READ      Let patient know recent labs were reviewed and BUN was elevated some at 24.  Glucose was 129.  Remind patient to drink enough water.  Cholesterol was 174, and LDL was 102.   We will discuss further at follow-up       [facility] FOR PT TO CALL [phone]

## 2023-10-11 NOTE — TELEPHONE ENCOUNTER
HUB TO READ      Let patient know recent lumbar x-ray series showed no acute fracture or malalignment.  Did show degenerative disc space at L5/S1 level.       LVM FOR PT TO CALL [phone] FOR XRAY RESULTS OR LOOK AT THEM ON [employer] AS.

## 2023-10-13 NOTE — TELEPHONE ENCOUNTER
Name: ROBI GUTIÉRREZ     Relationship: PATIENT     Best Callback Number: [phone]    HUB PROVIDED THE RELAY MESSAGE FROM THE OFFICE    PATIENT ASKED ABOUT XRAY RESULTS

## 2023-10-16 NOTE — TELEPHONE ENCOUNTER
Pt advised that she would wait to go to the hospital  Pt states that she thinks it just something that happens when she sit to long or when she stands to long  So she would wait till her apt on 11-06

## 2023-10-30 ENCOUNTER — TELEPHONE (OUTPATIENT)
Dept: FAMILY MEDICINE CLINIC | Facility: CLINIC | Age: 33
End: 2023-10-30
Payer: COMMERCIAL

## 2023-11-06 ENCOUNTER — LAB (OUTPATIENT)
Dept: LAB | Facility: HOSPITAL | Age: 33
End: 2023-11-06
Payer: COMMERCIAL

## 2023-11-06 ENCOUNTER — OFFICE VISIT (OUTPATIENT)
Dept: FAMILY MEDICINE CLINIC | Facility: CLINIC | Age: 33
End: 2023-11-06
Payer: COMMERCIAL

## 2023-11-06 VITALS
BODY MASS INDEX: 41.95 KG/M2 | HEART RATE: 102 BPM | SYSTOLIC BLOOD PRESSURE: 150 MMHG | TEMPERATURE: 98.2 F | HEIGHT: 70 IN | DIASTOLIC BLOOD PRESSURE: 92 MMHG | WEIGHT: 293 LBS

## 2023-11-06 DIAGNOSIS — G89.29 CHRONIC RIGHT-SIDED LOW BACK PAIN WITHOUT SCIATICA: ICD-10-CM

## 2023-11-06 DIAGNOSIS — R73.03 BORDERLINE DIABETES: ICD-10-CM

## 2023-11-06 DIAGNOSIS — R79.89 ELEVATED TSH: ICD-10-CM

## 2023-11-06 DIAGNOSIS — M54.50 CHRONIC RIGHT-SIDED LOW BACK PAIN WITHOUT SCIATICA: ICD-10-CM

## 2023-11-06 DIAGNOSIS — E55.9 VITAMIN D DEFICIENCY: ICD-10-CM

## 2023-11-06 DIAGNOSIS — I10 HYPERTENSION, UNSPECIFIED TYPE: ICD-10-CM

## 2023-11-06 DIAGNOSIS — M51.36 DDD (DEGENERATIVE DISC DISEASE), LUMBAR: ICD-10-CM

## 2023-11-06 LAB
T4 FREE SERPL-MCNC: 0.97 NG/DL (ref 0.93–1.7)
TSH SERPL DL<=0.05 MIU/L-ACNC: 5.77 UIU/ML (ref 0.27–4.2)

## 2023-11-06 PROCEDURE — 99214 OFFICE O/P EST MOD 30 MIN: CPT | Performed by: FAMILY MEDICINE

## 2023-11-06 PROCEDURE — 84443 ASSAY THYROID STIM HORMONE: CPT

## 2023-11-06 PROCEDURE — 84439 ASSAY OF FREE THYROXINE: CPT

## 2023-11-06 RX ORDER — GUANFACINE 2 MG/1
TABLET, EXTENDED RELEASE ORAL
COMMUNITY
Start: 2023-10-17

## 2023-11-06 RX ORDER — IBUPROFEN 600 MG/1
600 TABLET ORAL EVERY 8 HOURS PRN
Qty: 60 TABLET | Refills: 0 | Status: CANCELLED | OUTPATIENT
Start: 2023-11-06

## 2023-11-06 RX ORDER — METOPROLOL SUCCINATE 25 MG/1
25 TABLET, EXTENDED RELEASE ORAL DAILY
Qty: 90 TABLET | Refills: 1 | Status: SHIPPED | OUTPATIENT
Start: 2023-11-06

## 2023-11-06 NOTE — PROGRESS NOTES
Follow Up Office Visit      Date: 2023   Patient Name: Herbie Wong  : 1990   MRN: [id number]     Chief Complaint:    Chief Complaint   Patient presents with    Hypertension    Back Pain       History of Present Illness: Herbie Wong is a 33 y.o. female who presents today for follow-up.  A1c was 6.3.      With Mariposa in office today.      TSH was elevated at 5.140.  Vitamin D was low at 12.2.   Started Vit D supplement daily.      Patient was to start lisinopril 10 mg daily.  Takes at night.  Had the medication last night.  Reports has checked bp some at home.  145/96 P=119, yesterday 107/71  p=106      Previously reported back pain off and on for 6 months.  Reported worse if she stands too long.  Denied recent fall or loss of bladder or bowel function.  At last visit reported pain was 3 out of 10.  Achy but can be sharp.  Patient was to start ibuprofen 600 mg up to 3 times a day as needed.    Reports less pain but still having some trouble getting into the office.  Reports worse with walking or with prolonged standing.  Reports with sitting for 30 minutes or more as well as driving, can have pain and numbness that can radiate down right leg.        Had reported she was seen at [facility], but reports she was not treated due to her blood pressure.      Takes Vyvanse, and Intuniv for ADHD.  Prescribed by [facility]. -Magdaleno Seymour.       She is also prescribed Prozac 40 and 20 mg.    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    Has paperwork for temporary work at home.  Temp restriction of duties.  To get bp under control.     Plans PT after BP under better control.          Subjective      Review of Systems:   Review of Systems   Constitutional:  Negative for chills and fever.   Cardiovascular:  Negative for chest pain.   Musculoskeletal:  Positive for back pain.       I have reviewed the patients family history,  social history, past medical history, past surgical history and have updated it as appropriate.     Medications:     Current Outpatient Medications:     albuterol sulfate  (90 Base) MCG/ACT inhaler, Inhale 2 puffs Every 4 (Four) Hours As Needed for Wheezing., Disp: 18 g, Rfl: 3    cyclobenzaprine (FLEXERIL) 10 MG tablet, Take 1 tablet by mouth At Night As Needed for Muscle Spasms., Disp: 30 tablet, Rfl: 0    FLUoxetine (PROzac) 20 MG capsule, , Disp: , Rfl:     FLUoxetine (PROzac) 40 MG capsule, , Disp: , Rfl:     guanFACINE HCl ER 2 MG tablet sustained-release 24 hour, , Disp: , Rfl:     ibuprofen (ADVIL,MOTRIN) 600 MG tablet, Take 1 tablet by mouth Every 8 (Eight) Hours As Needed for Mild Pain., Disp: 60 tablet, Rfl: 0    lisinopril (PRINIVIL,ZESTRIL) 10 MG tablet, Take 1 tablet by mouth Daily., Disp: 90 tablet, Rfl: 1    metoprolol succinate XL (TOPROL-XL) 25 MG 24 hr tablet, Take 1 tablet by mouth Daily., Disp: 90 tablet, Rfl: 1    VYVANSE 50 MG capsule, , Disp: , Rfl: Answers submitted by the patient for this visit:  Office Visit on 11/6/2023  9:30 AM with Reed Brannon  Primary Reason for Visit (Submitted on 11/5/2023)  What is the primary reason for your visit?: Back Pain  Back Pain Questionnaire (Submitted on 11/5/2023)  Chief Complaint: Back pain  Chronicity: chronic  Onset: more than 1 month ago  Frequency: intermittently  Progression since onset: waxing and waning  Pain location: gluteal, sacro-iliac  Pain quality: aching, shooting, stabbing  Radiates to: right foot, right thigh  Pain - numeric: 1/10  Pain is: the same all the time  Aggravated by: bending, sitting, standing  Stiffness is present: all day  abdominal pain: No  bladder incontinence: No  bowel incontinence: No  chest pain: No  dysuria: No  fever: No  headaches: No  leg pain: Yes  numbness: Yes  paresis: No  paresthesias: Yes  pelvic pain: No  perianal numbness: No  tingling: Yes  weakness: Yes  weight loss: No  Risk factors: history  "of cancer, history of osteoporosis, lack of exercise, obesity, poor posture, sedentary lifestyle      Allergies:   Allergies   Allergen Reactions    Latex        Objective     Physical Exam: Please see above  Vital Signs:   Vitals:    11/06/23 0933   BP: 150/92   Pulse: 102   Temp: 98.2 °F (36.8 °C)   Weight: (!) 197 kg (435 lb 3.2 oz)   Height: 177.8 cm (70\")   PainSc:   2     Body mass index is 62.44 kg/m².  Class 3 Severe Obesity (BMI >=40). Obesity-related health conditions include the following: hypertension. Obesity is unchanged. BMI is is above average; BMI management plan is completed. We discussed portion control and increasing exercise.       Physical Exam  Constitutional:       Appearance: Normal appearance.   Cardiovascular:      Rate and Rhythm: Normal rate and regular rhythm.   Pulmonary:      Effort: Pulmonary effort is normal.   Neurological:      Mental Status: She is alert.   Psychiatric:         Mood and Affect: Mood normal.         Behavior: Behavior normal.         Procedures    Results:   Labs:   Hemoglobin A1C   Date Value Ref Range Status   10/10/2023 6.30 (H) 4.80 - 5.60 % Final     TSH   Date Value Ref Range Status   10/10/2023 5.140 (H) 0.270 - 4.200 uIU/mL Final        POCT Results (if applicable):   Results for orders placed or performed in visit on 10/10/23   Comprehensive Metabolic Panel    Specimen: Blood   Result Value Ref Range    Glucose 129 (H) 65 - 99 mg/dL    BUN 24 (H) 6 - 20 mg/dL    Creatinine 0.92 0.57 - 1.00 mg/dL    Sodium 141 136 - 145 mmol/L    Potassium 4.5 3.5 - 5.2 mmol/L    Chloride 103 98 - 107 mmol/L    CO2 26.0 22.0 - 29.0 mmol/L    Calcium 9.9 8.6 - 10.5 mg/dL    Total Protein 8.0 6.0 - 8.5 g/dL    Albumin 4.1 3.5 - 5.2 g/dL    ALT (SGPT) 56 (H) 1 - 33 U/L    AST (SGOT) 26 1 - 32 U/L    Alkaline Phosphatase 64 39 - 117 U/L    Total Bilirubin 0.9 0.0 - 1.2 mg/dL    Globulin 3.9 gm/dL    A/G Ratio 1.1 g/dL    BUN/Creatinine Ratio 26.1 (H) 7.0 - 25.0    Anion Gap " 12.0 5.0 - 15.0 mmol/L    eGFR 84.0 >60.0 mL/min/1.73   Lipid Panel    Specimen: Blood   Result Value Ref Range    Total Cholesterol 174 0 - 200 mg/dL    Triglycerides 100 0 - 150 mg/dL    HDL Cholesterol 54 40 - 60 mg/dL    LDL Cholesterol  102 (H) 0 - 100 mg/dL    VLDL Cholesterol 18 5 - 40 mg/dL    LDL/HDL Ratio 1.85    Vitamin D,25-Hydroxy    Specimen: Blood   Result Value Ref Range    25 Hydroxy, Vitamin D 12.2 (L) 30.0 - 100.0 ng/ml   Folate    Specimen: Blood   Result Value Ref Range    Folate 11.30 4.78 - 24.20 ng/mL   Vitamin B12    Specimen: Blood   Result Value Ref Range    Vitamin B-12 364 211 - 946 pg/mL   Hemoglobin A1c    Specimen: Blood   Result Value Ref Range    Hemoglobin A1C 6.30 (H) 4.80 - 5.60 %   TSH    Specimen: Blood   Result Value Ref Range    TSH 5.140 (H) 0.270 - 4.200 uIU/mL   CBC Auto Differential    Specimen: Blood   Result Value Ref Range    WBC 10.75 3.40 - 10.80 10*3/mm3    RBC 4.87 3.77 - 5.28 10*6/mm3    Hemoglobin 13.6 12.0 - 15.9 g/dL    Hematocrit 41.3 34.0 - 46.6 %    MCV 84.8 79.0 - 97.0 fL    MCH 27.9 26.6 - 33.0 pg    MCHC 32.9 31.5 - 35.7 g/dL    RDW 13.2 12.3 - 15.4 %    RDW-SD 40.5 37.0 - 54.0 fl    MPV 12.3 (H) 6.0 - 12.0 fL    Platelets 276 140 - 450 10*3/mm3    Neutrophil % 57.9 42.7 - 76.0 %    Lymphocyte % 32.2 19.6 - 45.3 %    Monocyte % 6.0 5.0 - 12.0 %    Eosinophil % 2.8 0.3 - 6.2 %    Basophil % 0.7 0.0 - 1.5 %    Immature Grans % 0.4 0.0 - 0.5 %    Neutrophils, Absolute 6.24 1.70 - 7.00 10*3/mm3    Lymphocytes, Absolute 3.46 (H) 0.70 - 3.10 10*3/mm3    Monocytes, Absolute 0.64 0.10 - 0.90 10*3/mm3    Eosinophils, Absolute 0.30 0.00 - 0.40 10*3/mm3    Basophils, Absolute 0.07 0.00 - 0.20 10*3/mm3    Immature Grans, Absolute 0.04 0.00 - 0.05 10*3/mm3    nRBC 0.0 0.0 - 0.2 /100 WBC     XR SPINE LUMBAR 2 OR 3 VW     Date of Exam: 10/10/2023 8:38 AM EDT     Indication: lumbar pain on right,     Comparison: None available.     Findings:  There is no acute fracture  or malalignment. There is degenerative disc base narrowing at the L5/S1 level. Other intervertebral disc bases are within normal limits. Sacroiliac joints are normal.     IMPRESSION:  Impression:  Degenerative disc disease at the L5/S1 level. No acute bony abnormality.        Electronically Signed: Dallas Laureano MD    10/10/2023 1:48 PM EDT    Workstation ID: [id number]  Imaging:   No valid procedures specified.     Measures:   Advanced Care Planning:   Patient does not have an advance directive, declines further assistance.    Smoking Cessation:   Does not smoke.    Assessment / Plan      Assessment/Plan:         1. Hypertension, unspecified type  Discussed importance of following low-salt diet for blood pressure control.  We will continue lisinopril.  Start metoprolol daily 25 mg.  - metoprolol succinate XL (TOPROL-XL) 25 MG 24 hr tablet; Take 1 tablet by mouth Daily.  Dispense: 90 tablet; Refill: 1    2. Elevated TSH  TSH elevated previously.  We will recheck TSH and add free T4.  Consider starting Synthroid if indicated.  - T4, Free; Future  - TSH; Future    3. Vitamin D deficiency  Continue OTC vitamin D.    4. Borderline diabetes  Discussed importance of portion control and making good food choices for diabetes control.    5. Chronic right-sided low back pain without sciatica  5-6 patient previously had physical therapy order.  Reports physical therapy would not treat her until her blood pressure was under better control.  Patient will likely need 6 weeks of physical therapy before additional imaging.  Discussed with patient that I will look at her paperwork when she sends it in.    6. DDD (degenerative disc disease), lumbar  As above.    Vaccine Counseling:        Follow Up:   Return in about 4 weeks (around 12/4/2023).      DO MAXIMILIAN York

## 2023-11-06 NOTE — PATIENT INSTRUCTIONS
Start metoprolol daily.  Continue lisinopril.  Limit salt.  Continue vitamin D daily  Labs today.  Send paperwork in for me to look at concerning working from home short-term.

## 2023-11-07 DIAGNOSIS — E03.9 ACQUIRED HYPOTHYROIDISM: Primary | ICD-10-CM

## 2023-11-07 RX ORDER — LEVOTHYROXINE SODIUM 0.03 MG/1
25 TABLET ORAL
Qty: 30 TABLET | Refills: 1 | Status: SHIPPED | OUTPATIENT
Start: 2023-11-07

## 2023-11-13 ENCOUNTER — TELEPHONE (OUTPATIENT)
Dept: FAMILY MEDICINE CLINIC | Facility: CLINIC | Age: 33
End: 2023-11-13
Payer: COMMERCIAL

## 2023-11-13 DIAGNOSIS — M51.36 DDD (DEGENERATIVE DISC DISEASE), LUMBAR: Primary | ICD-10-CM

## 2023-11-13 NOTE — TELEPHONE ENCOUNTER
"Caller: Herbie Wong \"Crista\"    Relationship: Self    Best call back number: [phone]    What form or medical record are you requesting: TEMPORARY WORK RESTRICTION    Who is requesting this form or medical record from you: EMPLOYER    Additional notes:     PATIENT SENT [employer] MESSAGE ON 10/25 AND HAS NOT HEARD ANYTHING       "

## 2023-11-13 NOTE — TELEPHONE ENCOUNTER
"Caller: Herbie Wong \"Crista\"    Relationship: Self    Best call back number: [phone]    What is the best time to reach you: ANY    Who are you requesting to speak with (clinical staff, provider,  specific staff member): CLINICAL        What was the call regardin/7 - 3:44 /81 PULSE 98  8 - 7:05 /110 PULSE 122   - 7:04 PM  114/92 PULSE 92  11/10 - 7:20 PM  134/94 PULSE 107   - 11:52 /91 PULSE 75   - 8:15 AM  125/81 PULSE 74    Is it okay if the provider responds through MyChart: NO          "

## 2023-11-14 NOTE — TELEPHONE ENCOUNTER
Patient has been seen after 10/25.  Was to bring in papers for me to look at.    At this point, blood pressures and pulses are much better.    As in a separate thread, I have placed a referral to [facility] for evaluation for fit for work.

## 2023-11-14 NOTE — TELEPHONE ENCOUNTER
Office to call patient.    Blood pressures and pulses are looking much better.  11/7 - 3:44 /81 PULSE 98  11/8 - 7:05 /110 PULSE 122  11/9 - 7:04 PM  114/92 PULSE 92  11/10 - 7:20 PM  134/94 PULSE 107  11/11 - 11:52 /91 PULSE 75  11/13 - 8:15 AM  125/81 PULSE 74    I have placed a referral to [facility] for evaluation for Fit for Work evaluation.    Let patient know when she was seen on 11/6/23, she was to bring in paperwork for me to look at.   I have not seen papers.

## 2023-11-14 NOTE — TELEPHONE ENCOUNTER
"Hub can read       Lvm for pt to call back     \"\"  Office to call patient.     Blood pressures and pulses are looking much better.  11/7 - 3:44 /81 PULSE 98  11/8 - 7:05 /110 PULSE 122  11/9 - 7:04 PM  114/92 PULSE 92  11/10 - 7:20 PM  134/94 PULSE 107  11/11 - 11:52 /91 PULSE 75  11/13 - 8:15 AM  125/81 PULSE 74     I have placed a referral to [facility] for evaluation for Fit for Work evaluation.     Let patient know when she was seen on 11/6/23, she was to bring in paperwork for me to look at.   I have not seen papers.        "

## 2023-11-15 NOTE — TELEPHONE ENCOUNTER
Hub to read       Lvm fo r pt to call back        Patient has been seen after 10/25.  Was to bring in papers for me to look at.    At this point, blood pressures and pulses are much better.    As in a separate thread, I have placed a referral to [facility] for evaluation for fit for work.

## 2024-01-11 DIAGNOSIS — E03.9 ACQUIRED HYPOTHYROIDISM: ICD-10-CM

## 2024-01-11 NOTE — TELEPHONE ENCOUNTER
Rx Refill Note  Requested Prescriptions     Pending Prescriptions Disp Refills    levothyroxine (SYNTHROID, LEVOTHROID) 25 MCG tablet [Pharmacy Med Name: LEVOTHYROXINE 25 MCG TABLET] 30 tablet 1     Sig: TAKE ONE TABLET BY MOUTH EVERY MORNING      Last office visit with prescribing clinician: 11/6/2023   Last telemedicine visit with prescribing clinician: Visit date not found   Next office visit with prescribing clinician: Visit date not found                         Would you like a call back once the refill request has been completed: [] Yes [] No    If the office needs to give you a call back, can they leave a voicemail: [] Yes [] No    Madison Fowler  01/11/24, 08:49 EST

## 2024-01-12 RX ORDER — LEVOTHYROXINE SODIUM 0.03 MG/1
25 TABLET ORAL EVERY MORNING
Qty: 30 TABLET | Refills: 1 | Status: SHIPPED | OUTPATIENT
Start: 2024-01-12

## 2024-01-16 ENCOUNTER — OFFICE VISIT (OUTPATIENT)
Dept: FAMILY MEDICINE CLINIC | Facility: CLINIC | Age: 34
End: 2024-01-16
Payer: COMMERCIAL

## 2024-01-16 ENCOUNTER — LAB (OUTPATIENT)
Dept: LAB | Facility: HOSPITAL | Age: 34
End: 2024-01-16
Payer: COMMERCIAL

## 2024-01-16 VITALS
SYSTOLIC BLOOD PRESSURE: 144 MMHG | WEIGHT: 293 LBS | TEMPERATURE: 98.4 F | HEART RATE: 117 BPM | HEIGHT: 70 IN | DIASTOLIC BLOOD PRESSURE: 86 MMHG | RESPIRATION RATE: 22 BRPM | BODY MASS INDEX: 41.95 KG/M2 | OXYGEN SATURATION: 96 %

## 2024-01-16 DIAGNOSIS — Z00.00 ANNUAL PHYSICAL EXAM: Primary | ICD-10-CM

## 2024-01-16 DIAGNOSIS — E03.9 ACQUIRED HYPOTHYROIDISM: ICD-10-CM

## 2024-01-16 DIAGNOSIS — Z23 NEED FOR IMMUNIZATION AGAINST INFLUENZA: ICD-10-CM

## 2024-01-16 DIAGNOSIS — I10 PRIMARY HYPERTENSION: ICD-10-CM

## 2024-01-16 DIAGNOSIS — I10 HYPERTENSION, UNSPECIFIED TYPE: ICD-10-CM

## 2024-01-16 DIAGNOSIS — M51.37 DDD (DEGENERATIVE DISC DISEASE), LUMBOSACRAL: ICD-10-CM

## 2024-01-16 DIAGNOSIS — E28.2 PCOS (POLYCYSTIC OVARIAN SYNDROME): ICD-10-CM

## 2024-01-16 LAB — TSH SERPL DL<=0.05 MIU/L-ACNC: 4.06 UIU/ML (ref 0.27–4.2)

## 2024-01-16 PROCEDURE — 84443 ASSAY THYROID STIM HORMONE: CPT

## 2024-01-16 RX ORDER — METOPROLOL SUCCINATE 50 MG/1
50 TABLET, EXTENDED RELEASE ORAL DAILY
Qty: 90 TABLET | Refills: 3 | Status: SHIPPED | OUTPATIENT
Start: 2024-01-16

## 2024-01-16 RX ORDER — SPIRONOLACTONE 25 MG/1
25 TABLET ORAL DAILY
Qty: 30 TABLET | Refills: 5 | Status: SHIPPED | OUTPATIENT
Start: 2024-01-16

## 2024-01-16 NOTE — PROGRESS NOTES
Subjective   Herbie Wong is a 33 y.o. female and is here for a comprehensive physical exam. The patient reports  some DDD l5 si1 interested in PT, bp and hr still some what high.  Ok with increase metoprolol. . Patient reports last physical date of more than 1 year  HPI      Do you take any herbs or supplements that were not prescribed by a doctor? no  Are you taking calcium supplements? no  Are you taking aspirin daily? no  Family history of ovarian cancer? no  Family history of breast cancer?  Pat gm  FH of endometrial cancer? no  FH of cervical cancer? no  FH of colon cancer? no    Cancer Screening  Mammogram up-to-date?  no  If yes, last exam date: not yet  BMD up-to-date? no  If yes, last exam date: na  Colonoscopy up-to-date? no   If yes, last exam date: not yet  Pap up-to-date? no   If yes, last exam date: never had one     History:  LMP: No LMP recorded. Irreg with heavy cycle and clots, concerned about pcos.   Menopause at na years  Last pap date: never had  Abnormal pap? no  : 0  Para: 0    Immunization History  Tdap?  2017  HPV? yes  Pneumonia? not applicable  Shingles? not applicable    The following portions of the patient's history were reviewed and updated as appropriate: allergies, current medications, past family history, past medical history, past social history, past surgical history, and problem list.    Past Medical History:   Diagnosis Date    Acute pharyngitis     streptococcal     Asthmatic bronchitis     Cough     Sore throat        Family History   Problem Relation Age of Onset    Other Mother         High Blood Pressure     Hypertension Mother     Other Father         High Blood Pressure     Hypertension Father     Diabetes Paternal Grandmother        Past Surgical History:   Procedure Laterality Date    NO PAST SURGERIES         Social History     Socioeconomic History    Marital status:    Tobacco Use    Smoking status: Never    Smokeless tobacco: Never    Substance and Sexual Activity    Alcohol use: Yes     Comment: social-once a year    Drug use: No    Sexual activity: Yes     Partners: Female     Comment:  to her wife       Review of Systems  Do you have pain that bothers you in your daily life? no  Review of Systems   Constitutional: Negative.    HENT: Negative.     Eyes: Negative.    Respiratory: Negative.     Cardiovascular: Negative.    Gastrointestinal: Negative.    Endocrine: Negative.    Genitourinary: Negative.    Musculoskeletal:  Positive for back pain.   Skin: Negative.    Allergic/Immunologic: Negative.    Neurological: Negative.    Hematological: Negative.    Psychiatric/Behavioral: Negative.     All other systems reviewed and are negative.      Objective   Physical Exam  Vitals and nursing note reviewed.   Constitutional:       Appearance: She is well-developed. She is obese.   HENT:      Head: Normocephalic and atraumatic.      Right Ear: Tympanic membrane normal.      Left Ear: Tympanic membrane normal.      Nose: No congestion or rhinorrhea.      Mouth/Throat:      Pharynx: No oropharyngeal exudate or posterior oropharyngeal erythema.   Eyes:      General:         Right eye: No discharge.         Left eye: No discharge.      Pupils: Pupils are equal, round, and reactive to light.   Cardiovascular:      Rate and Rhythm: Normal rate and regular rhythm.      Heart sounds: Normal heart sounds.   Pulmonary:      Effort: Pulmonary effort is normal.      Breath sounds: Normal breath sounds.   Abdominal:      General: Bowel sounds are normal.      Palpations: Abdomen is soft. There is no mass.      Tenderness: There is no abdominal tenderness.   Musculoskeletal:         General: Normal range of motion.      Right shoulder: No swelling.      Cervical back: Normal range of motion and neck supple.   Skin:     General: Skin is warm and dry.      Findings: Rash present.      Nails: There is no clubbing.      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  Some her just to some with darkly pigmented hair on her chin and neck   Neurological:      Mental Status: She is alert and oriented to person, place, and time.      Deep Tendon Reflexes: Reflexes are normal and symmetric.   Psychiatric:         Behavior: Behavior normal.         Thought Content: Thought content normal.         Judgment: Judgment normal.          Assessment & Plan   Healthy female exam.      1.   Problem List Items Addressed This Visit          Cardiac and Vasculature    Hypertension    Relevant Medications    metoprolol succinate XL (TOPROL-XL) 50 MG 24 hr tablet    spironolactone (Aldactone) 25 MG tablet       Endocrine and Metabolic    Acquired hypothyroidism    Relevant Medications    metoprolol succinate XL (TOPROL-XL) 50 MG 24 hr tablet    Other Relevant Orders    TSH (Completed)       Health Encounters    Annual physical exam - Primary       Other    Need for immunization against influenza    Relevant Orders    Fluzone >6 Months (3436-1030) (Completed)     Other Visit Diagnoses       DDD (degenerative disc disease), lumbosacral        Relevant Orders    Ambulatory Referral to Physical Therapy Evaluate and treat    PCOS (polycystic ovarian syndrome)        Relevant Medications    metFORMIN (GLUCOPHAGE) 500 MG tablet    spironolactone (Aldactone) 25 MG tablet              2. Patient Counseling:  --Nutrition: Stressed importance of moderation in sodium/caffeine intake, saturated fat and cholesterol, caloric balance, sufficient intake of fresh fruits, vegetables, fiber, calcium, iron, and 1 mg of folate supplement per day (for females capable of pregnancy).  --Discussed the issue of estrogen replacement, calcium supplement, and the daily use of baby aspirin.  --Exercise: Stressed the importance of regular exercise.   --Substance Abuse: Discussed cessation/primary prevention of tobacco, alcohol, or other drug use; driving or other dangerous activities under the  influence; availability of treatment for abuse.    --Sexuality: Discussed sexually transmitted diseases, partner selection, use of condoms, avoidance of unintended pregnancy  and contraceptive alternatives.   --Injury prevention: Discussed safety belts, safety helmets, smoke detector, smoking near bedding or upholstery.   --Dental health: Discussed importance of regular tooth brushing, flossing, and dental visits.  --Immunizations reviewed.  --Discussed benefits of screening colonoscopy.  --After hours service discussed with patient    3. Discussed the patient's BMI with her.  The BMI is above average; BMI management plan is completed  4. Follow up in 3 months

## 2024-01-18 ENCOUNTER — TELEPHONE (OUTPATIENT)
Dept: FAMILY MEDICINE CLINIC | Facility: CLINIC | Age: 34
End: 2024-01-18
Payer: COMMERCIAL

## 2024-01-26 PROBLEM — Z01.419 WELL WOMAN EXAM: Status: ACTIVE | Noted: 2024-01-26

## 2024-01-26 PROBLEM — Z23 NEED FOR IMMUNIZATION AGAINST INFLUENZA: Status: RESOLVED | Noted: 2024-01-16 | Resolved: 2024-01-26

## 2024-01-30 ENCOUNTER — OFFICE VISIT (OUTPATIENT)
Dept: OBSTETRICS AND GYNECOLOGY | Facility: CLINIC | Age: 34
End: 2024-01-30
Payer: COMMERCIAL

## 2024-01-30 VITALS
DIASTOLIC BLOOD PRESSURE: 82 MMHG | SYSTOLIC BLOOD PRESSURE: 136 MMHG | RESPIRATION RATE: 14 BRPM | BODY MASS INDEX: 61.7 KG/M2 | WEIGHT: 293 LBS

## 2024-01-30 DIAGNOSIS — Z01.419 WELL WOMAN EXAM: Primary | ICD-10-CM

## 2024-01-30 DIAGNOSIS — N94.6 DYSMENORRHEA: ICD-10-CM

## 2024-01-30 DIAGNOSIS — N92.6 IRREGULAR MENSES: ICD-10-CM

## 2024-01-30 PROBLEM — I10 PRIMARY HYPERTENSION: Status: ACTIVE | Noted: 2023-11-01

## 2024-01-30 PROBLEM — R00.0 TACHYCARDIA: Status: ACTIVE | Noted: 2023-11-01

## 2024-01-30 PROBLEM — R73.09 ELEVATED HEMOGLOBIN A1C: Status: ACTIVE | Noted: 2024-01-01

## 2024-01-30 PROBLEM — I10 PRIMARY HYPERTENSION: Status: RESOLVED | Noted: 2017-01-24 | Resolved: 2024-01-30

## 2024-01-30 PROCEDURE — 99385 PREV VISIT NEW AGE 18-39: CPT | Performed by: OBSTETRICS & GYNECOLOGY

## 2024-01-30 RX ORDER — GUANFACINE 2 MG/1
1 TABLET, EXTENDED RELEASE ORAL DAILY
Start: 2024-01-30

## 2024-01-30 RX ORDER — MEDROXYPROGESTERONE ACETATE 10 MG/1
TABLET ORAL
Qty: 30 TABLET | Refills: 1 | Status: SHIPPED | OUTPATIENT
Start: 2024-01-30

## 2024-01-30 RX ORDER — NORETHINDRONE ACETATE AND ETHINYL ESTRADIOL 1MG-20(21)
1 KIT ORAL DAILY
Qty: 84 TABLET | Refills: 1 | Status: SHIPPED | OUTPATIENT
Start: 2024-01-30 | End: 2024-01-30

## 2024-01-30 NOTE — PROGRESS NOTES
Subjective   Chief Complaint   Patient presents with    Gynecologic Exam     Herbie Wong is a 33 y.o. year old  presenting to be seen for her annual exam.     SEXUAL Hx:  She is currently sexually active.  In the past year there there has been NO new sexual partners.    Condoms are not needed because she is in a same sex relationship.  She would not like to be screened for STD's at today's exam.  Current birth control method: not needed because she is in a same sex relationship.  She is happy with her current method of contraception and does not want to discuss alternative methods of contraception.  MENSTRUAL Hx:  Patient's last menstrual period was 2024 (approximate).  In the past 6 months her cycles have been infrequent.  Her menstrual flow has been heavy some months and light other months.   Each month on average there are roughly 0 day(s) of very heavy flow.  Intermenstrual bleeding is absent.    Post-coital bleeding is absent.  Dysmenorrhea: moderate and is not affecting her activities of daily living  PMS: is not affecting her activities of daily living  Her cycles ARE a source of concern for her that she wishes to discuss today.  HEALTH Hx:  She exercises regularly: no (but is planning to start exercising more).  She wears her seat belt: yes.  She has concerns about domestic violence: no.    The following portions of the patient's history were reviewed and updated as appropriate:problem list, current medications, allergies, past family history, past medical history, past social history, and past surgical history.    Social History    Tobacco Use      Smoking status: Never      Smokeless tobacco: Never    Review of Systems  Constitutional POS: nothing reported    NEG: anorexia or night sweats   Genitourinary POS: nothing reported    NEG: dysuria or hematuria      Gastointestinal POS: nothing reported    NEG: bloating, change in bowel habits, melena, or reflux symptoms   Integument POS:  nothing reported    NEG: moles that are changing in size, shape, color or rashes   Breast POS: nothing reported    NEG: persistent breast lump, skin dimpling, or nipple discharge        Objective   /82   Resp 14   Wt (!) 195 kg (430 lb)   LMP 01/26/2024 (Approximate)   BMI 61.70 kg/m²     General:  well developed; well nourished  no acute distress   Skin:  No suspicious lesions seen   Thyroid: normal to inspection and palpation   Breasts:  Examined in supine position  Symmetric without masses or skin dimpling  Nipples normal without inversion, lesions or discharge  There are no palpable axillary nodes   Abdomen: soft, non-tender; no masses  no umbilical or inguinal hernias are present  no hepato-splenomegaly   Pelvis: Exam limited by  body habitus  Clinical staff was present for exam  External genitalia:  normal appearance of the external genitalia including Bartholin's and Anegam's glands.  :  urethral meatus normal;  Vaginal:  normal pink mucosa without prolapse or lesions.  Cervix:  normal appearance.  Uterus:  normal size, shape and consistency.  Adnexa:  non palpable bilaterally.  Rectal:  digital rectal exam not performed; anus visually normal appearing.        Assessment   Normal GYN exam limited by weight  Irregular menses with heavy flow and dysmenorrhea impacting activities of daily living.  History is highly suggestive of anovulatory bleeding  Hirsutism most consistent with CAOS with HAIR-AN       Plan   Pap was done today.  If she does not receive the results of the Pap within 2 weeks  time, she was instructed to call to find out the results.  I explained to Callissa that the recommendations for Pap smear interval in a low risk patient's has lengthened to 3 years time.  I encouraged her to be seen yearly for a full physical exam including breast and pelvic exam even during the off years when PAP's will not be performed.  I reviewed association with anovulatory bleeding with obesity,  endometrial cancer, due to some strongly suggested some form of cyclic menstrual withdrawal with either cyclic progestins or birth control pill to lower endometrial cancer risks  I have counseled the patient on the importance of staying up to date with preventive vaccines as well as the risks and benefits of these vaccines.  Her vaccine record was reviewed and updated.  The importance of keeping all planned follow-up and taking all medications as prescribed was emphasized.  Follow up in 4 months to recheck bleeding patterns     New Medications Ordered This Visit   Medications    medroxyPROGESTERone (Provera) 10 MG tablet     Si pill daily day 16 through 25 every month     Dispense:  30 tablet     Refill:  1          This note was electronically signed.    Adal Connolly M.D.  2024    Part of this note may be an electronic transcription/translation of spoken language to printed text using the Dragon Dictation System.

## 2024-02-02 LAB — REF LAB TEST METHOD: NORMAL

## 2024-02-26 DIAGNOSIS — E03.9 ACQUIRED HYPOTHYROIDISM: ICD-10-CM

## 2024-02-26 DIAGNOSIS — I10 HYPERTENSION, UNSPECIFIED TYPE: ICD-10-CM

## 2024-02-26 NOTE — TELEPHONE ENCOUNTER
Rx Refill Note  Requested Prescriptions     Pending Prescriptions Disp Refills    lisinopril (PRINIVIL,ZESTRIL) 10 MG tablet 90 tablet 1     Sig: Take 1 tablet by mouth Daily.    levothyroxine (SYNTHROID, LEVOTHROID) 25 MCG tablet 30 tablet 1     Sig: Take 1 tablet by mouth Every Morning.      Last office visit with prescribing clinician: 11/6/2023   Last telemedicine visit with prescribing clinician: Visit date not found   Next office visit with prescribing clinician: 4/16/2024                         Would you like a call back once the refill request has been completed: [] Yes [] No    If the office needs to give you a call back, can they leave a voicemail: [] Yes [] No    Miranda Combs MA  02/26/24, 15:48 EST

## 2024-02-27 RX ORDER — LISINOPRIL 10 MG/1
10 TABLET ORAL DAILY
Qty: 90 TABLET | Refills: 1 | Status: SHIPPED | OUTPATIENT
Start: 2024-02-27

## 2024-02-27 RX ORDER — LEVOTHYROXINE SODIUM 0.03 MG/1
25 TABLET ORAL EVERY MORNING
Qty: 90 TABLET | Refills: 0 | Status: SHIPPED | OUTPATIENT
Start: 2024-02-27

## 2024-05-21 DIAGNOSIS — I10 HYPERTENSION, UNSPECIFIED TYPE: ICD-10-CM

## 2024-05-21 DIAGNOSIS — E28.2 PCOS (POLYCYSTIC OVARIAN SYNDROME): ICD-10-CM

## 2024-05-21 RX ORDER — SPIRONOLACTONE 25 MG/1
25 TABLET ORAL DAILY
Qty: 30 TABLET | Refills: 5 | Status: SHIPPED | OUTPATIENT
Start: 2024-05-21

## 2024-05-21 RX ORDER — METOPROLOL SUCCINATE 50 MG/1
50 TABLET, EXTENDED RELEASE ORAL DAILY
Qty: 90 TABLET | Refills: 3 | Status: SHIPPED | OUTPATIENT
Start: 2024-05-21

## 2024-05-30 ENCOUNTER — OFFICE VISIT (OUTPATIENT)
Dept: OBSTETRICS AND GYNECOLOGY | Facility: CLINIC | Age: 34
End: 2024-05-30
Payer: COMMERCIAL

## 2024-05-30 VITALS — WEIGHT: 293 LBS | RESPIRATION RATE: 14 BRPM | BODY MASS INDEX: 61.7 KG/M2

## 2024-05-30 DIAGNOSIS — N93.8 DUB (DYSFUNCTIONAL UTERINE BLEEDING): Primary | ICD-10-CM

## 2024-05-30 PROCEDURE — 99212 OFFICE O/P EST SF 10 MIN: CPT | Performed by: OBSTETRICS & GYNECOLOGY

## 2024-05-30 RX ORDER — MEDROXYPROGESTERONE ACETATE 10 MG/1
TABLET ORAL
Qty: 30 TABLET | Refills: 3 | Status: SHIPPED | OUTPATIENT
Start: 2024-05-30

## 2024-05-30 NOTE — PROGRESS NOTES
Subjective   Chief Complaint   Patient presents with    Medication Reaction       Callissa Faizan Potter is a 34 y.o. year old .  Patient's last menstrual period was 2024.  She comes back in follow-up.  If she has been doing well with the progesterone taken day 16 through 25 of each menstrual month.  The first month things went a little bit sideways but now things are much more predictable.  Cycles are light.  She is having no side effects with the medicine.    The following portions of the patient's history were reviewed and updated as appropriate:current medications and allergies    Social History    Tobacco Use      Smoking status: Never      Smokeless tobacco: Never         Objective   Resp 14   Wt (!) 195 kg (430 lb)   LMP 2024   BMI 61.70 kg/m²     Lab Review   No data reviewed    Imaging   No data reviewed        Assessment   Dysfunctional uterine bleeding, well-controlled with cyclic progestin     Plan   If bleeding becomes problematic, absent or bothersome to her she will let me know but otherwise would continue with the medicine without change  The importance of keeping all planned follow-up and taking all medications as prescribed was emphasized.  Follow up annual exam approximately 2025     New Medications Ordered This Visit   Medications    medroxyPROGESTERone (Provera) 10 MG tablet     Si pill daily day 16 through 25 every month     Dispense:  30 tablet     Refill:  3          This note was electronically signed.    Adal Connolly M.D.  May 30, 2024        Part of this note may be an electronic transcription/translation of spoken language to printed text using the Dragon Dictation System.

## 2024-10-10 DIAGNOSIS — I10 HYPERTENSION, UNSPECIFIED TYPE: ICD-10-CM

## 2024-10-10 DIAGNOSIS — E03.9 ACQUIRED HYPOTHYROIDISM: ICD-10-CM

## 2024-10-13 RX ORDER — LISINOPRIL 10 MG/1
10 TABLET ORAL DAILY
Qty: 90 TABLET | Refills: 0 | Status: SHIPPED | OUTPATIENT
Start: 2024-10-13

## 2024-10-13 RX ORDER — LEVOTHYROXINE SODIUM 25 UG/1
25 TABLET ORAL EVERY MORNING
Qty: 90 TABLET | Refills: 0 | Status: SHIPPED | OUTPATIENT
Start: 2024-10-13

## 2024-11-07 DIAGNOSIS — I10 HYPERTENSION, UNSPECIFIED TYPE: ICD-10-CM

## 2024-11-07 DIAGNOSIS — E03.9 ACQUIRED HYPOTHYROIDISM: ICD-10-CM

## 2024-11-07 RX ORDER — LEVOTHYROXINE SODIUM 25 UG/1
25 TABLET ORAL EVERY MORNING
Qty: 90 TABLET | Refills: 0 | OUTPATIENT
Start: 2024-11-07

## 2024-11-07 RX ORDER — LISINOPRIL 10 MG/1
10 TABLET ORAL DAILY
Qty: 90 TABLET | Refills: 0 | OUTPATIENT
Start: 2024-11-07

## 2025-01-21 DIAGNOSIS — E03.9 ACQUIRED HYPOTHYROIDISM: ICD-10-CM

## 2025-01-21 DIAGNOSIS — I10 HYPERTENSION, UNSPECIFIED TYPE: ICD-10-CM

## 2025-01-26 RX ORDER — LISINOPRIL 10 MG/1
10 TABLET ORAL DAILY
Qty: 90 TABLET | Refills: 0 | Status: SHIPPED | OUTPATIENT
Start: 2025-01-26

## 2025-01-26 RX ORDER — LEVOTHYROXINE SODIUM 25 UG/1
25 TABLET ORAL EVERY MORNING
Qty: 90 TABLET | Refills: 0 | Status: SHIPPED | OUTPATIENT
Start: 2025-01-26

## 2025-01-31 ENCOUNTER — OFFICE VISIT (OUTPATIENT)
Dept: OBSTETRICS AND GYNECOLOGY | Facility: CLINIC | Age: 35
End: 2025-01-31
Payer: COMMERCIAL

## 2025-01-31 VITALS
DIASTOLIC BLOOD PRESSURE: 82 MMHG | BODY MASS INDEX: 63.56 KG/M2 | RESPIRATION RATE: 14 BRPM | SYSTOLIC BLOOD PRESSURE: 136 MMHG | WEIGHT: 293 LBS

## 2025-01-31 DIAGNOSIS — N93.8 DUB (DYSFUNCTIONAL UTERINE BLEEDING): ICD-10-CM

## 2025-01-31 DIAGNOSIS — Z71.85 VACCINE COUNSELING: ICD-10-CM

## 2025-01-31 DIAGNOSIS — Z01.419 WELL WOMAN EXAM: Primary | ICD-10-CM

## 2025-01-31 PROCEDURE — 99395 PREV VISIT EST AGE 18-39: CPT | Performed by: OBSTETRICS & GYNECOLOGY

## 2025-01-31 RX ORDER — MEDROXYPROGESTERONE ACETATE 10 MG
TABLET ORAL
Qty: 30 TABLET | Refills: 4 | Status: SHIPPED | OUTPATIENT
Start: 2025-01-31

## 2025-01-31 NOTE — PROGRESS NOTES
Subjective   Chief Complaint   Patient presents with    Gynecologic Exam     Herbie Wong is a 34 y.o. year old  presenting to be seen for her annual exam.  She has been taking progesterone from cycle day 16 to cycle day 25 which has helped her menstrual cycle significantly.  They are now predictable and not very heavy and not very painful.    SEXUAL Hx:  She is currently sexually active.  In the past year there there has been NO new sexual partners.    Condoms are not needed because she is in a same sex relationship.  She would not like to be screened for STD's at today's exam.  Current birth control method: not needed because she is in a same sex relationship.  HEALTH Hx:  She exercises regularly: no (but is planning to start exercising more).  She wears her seat belt: yes.  She has concerns about domestic violence: no.    The following portions of the patient's history were reviewed and updated as appropriate:problem list, current medications, allergies, past family history, past medical history, past social history, and past surgical history.    Social History    Tobacco Use      Smoking status: Never      Smokeless tobacco: Never    Review of Systems  Constitutional POS: nothing reported    NEG: anorexia or night sweats   Genitourinary POS: nothing reported    NEG: dysuria or hematuria      Gastointestinal POS: nothing reported    NEG: bloating, change in bowel habits, melena, or reflux symptoms   Integument POS: nothing reported    NEG: moles that are changing in size, shape, color or rashes   Breast POS: nothing reported    NEG: persistent breast lump, skin dimpling, or nipple discharge        Objective   /82   Resp 14   Wt (!) 201 kg (443 lb)   BMI 63.56 kg/m²     General:  well developed; well nourished  no acute distress  mentation appropriate   Skin:  No suspicious lesions seen   Thyroid: normal to inspection and palpation   Breasts:  Examined in supine position  Symmetric  without masses or skin dimpling  Nipples normal without inversion, lesions or discharge  There are no palpable axillary nodes   Abdomen: soft, non-tender; no masses  no umbilical or inguinal hernias are present  no hepato-splenomegaly   Pelvis: Exam limited by  body habitus  Clinical staff was present for exam  External genitalia:  normal appearance of the external genitalia including Bartholin's and Prosper's glands.  :  urethral meatus normal; urethra normal:  Vaginal:  normal pink mucosa without prolapse or lesions.  Cervix:  normal appearance.  Uterus:  not palpable.  Adnexa:  non palpable bilaterally.        Assessment   Normal GYN exam limited by weight  Dysfunctional uterine bleeding well-controlled with cyclic progestin  Hirsutism most consistent with CAOS with HAIR-AN      Plan   Pap was not done today.  I explained to Herbie that the recommendations for Pap smear interval in a low risk patient has lengthened to 3 years time.  I told Herbie she still needs to be seen in our office yearly for a full physical including breast and pelvic exam.  I have counseled the patient on the importance of staying up to date with preventive vaccines as well as the risks and benefits of these vaccines.  Her vaccine record was reviewed and updated.  I discussed with Herbie that she may be behind on needed vaccinations for Influenza.  She may be able to obtain these vaccinations at her local pharmacy OR speak about obtaining them with her primary care.  If she does obtain her vaccines, I have asked Herbie to let us know the date each vaccine was obtained so that her medical record could be updated in our system.  The importance of keeping all planned follow-up and taking all medications as prescribed was emphasized.  Follow up for annual exam 1 year    New Medications Ordered This Visit   Medications    medroxyPROGESTERone (Provera) 10 MG tablet     Si pill daily day 16 through 25 every month     Dispense:  30  tablet     Refill:  4          This note was electronically signed.    Adal Connolly M.D.  January 31, 2025    Part of this note may be an electronic transcription/translation of spoken language to printed text using the Dragon Dictation System.

## 2025-02-02 DIAGNOSIS — E28.2 PCOS (POLYCYSTIC OVARIAN SYNDROME): ICD-10-CM

## 2025-02-03 RX ORDER — SPIRONOLACTONE 25 MG/1
25 TABLET ORAL DAILY
Qty: 30 TABLET | Refills: 1 | Status: SHIPPED | OUTPATIENT
Start: 2025-02-03

## 2025-02-11 ENCOUNTER — LAB (OUTPATIENT)
Dept: LAB | Facility: HOSPITAL | Age: 35
End: 2025-02-11
Payer: COMMERCIAL

## 2025-02-11 ENCOUNTER — OFFICE VISIT (OUTPATIENT)
Dept: FAMILY MEDICINE CLINIC | Facility: CLINIC | Age: 35
End: 2025-02-11
Payer: COMMERCIAL

## 2025-02-11 VITALS
OXYGEN SATURATION: 98 % | SYSTOLIC BLOOD PRESSURE: 131 MMHG | DIASTOLIC BLOOD PRESSURE: 81 MMHG | HEART RATE: 112 BPM | BODY MASS INDEX: 41.95 KG/M2 | HEIGHT: 70 IN | WEIGHT: 293 LBS | TEMPERATURE: 98.7 F

## 2025-02-11 DIAGNOSIS — E03.9 ACQUIRED HYPOTHYROIDISM: ICD-10-CM

## 2025-02-11 DIAGNOSIS — Z00.00 ANNUAL PHYSICAL EXAM: ICD-10-CM

## 2025-02-11 DIAGNOSIS — R73.09 ELEVATED HEMOGLOBIN A1C: ICD-10-CM

## 2025-02-11 DIAGNOSIS — E28.2 PCOS (POLYCYSTIC OVARIAN SYNDROME): ICD-10-CM

## 2025-02-11 DIAGNOSIS — Z00.00 ANNUAL PHYSICAL EXAM: Primary | ICD-10-CM

## 2025-02-11 DIAGNOSIS — L81.1 MELASMA: ICD-10-CM

## 2025-02-11 DIAGNOSIS — I10 PRIMARY HYPERTENSION: ICD-10-CM

## 2025-02-11 DIAGNOSIS — Z23 IMMUNIZATION DUE: ICD-10-CM

## 2025-02-11 LAB
ALBUMIN SERPL-MCNC: 3.9 G/DL (ref 3.5–5.2)
ALBUMIN/GLOB SERPL: 0.9 G/DL
ALP SERPL-CCNC: 65 U/L (ref 39–117)
ALT SERPL W P-5'-P-CCNC: 36 U/L (ref 1–33)
ANION GAP SERPL CALCULATED.3IONS-SCNC: 14.2 MMOL/L (ref 5–15)
AST SERPL-CCNC: 19 U/L (ref 1–32)
BASOPHILS # BLD AUTO: 0.05 10*3/MM3 (ref 0–0.2)
BASOPHILS NFR BLD AUTO: 0.5 % (ref 0–1.5)
BILIRUB SERPL-MCNC: 0.6 MG/DL (ref 0–1.2)
BUN SERPL-MCNC: 16 MG/DL (ref 6–20)
BUN/CREAT SERPL: 17.2 (ref 7–25)
CALCIUM SPEC-SCNC: 9.9 MG/DL (ref 8.6–10.5)
CHLORIDE SERPL-SCNC: 99 MMOL/L (ref 98–107)
CHOLEST SERPL-MCNC: 165 MG/DL (ref 0–200)
CO2 SERPL-SCNC: 23.8 MMOL/L (ref 22–29)
CREAT SERPL-MCNC: 0.93 MG/DL (ref 0.57–1)
DEPRECATED RDW RBC AUTO: 39 FL (ref 37–54)
EGFRCR SERPLBLD CKD-EPI 2021: 82.4 ML/MIN/1.73
EOSINOPHIL # BLD AUTO: 0.29 10*3/MM3 (ref 0–0.4)
EOSINOPHIL NFR BLD AUTO: 2.7 % (ref 0.3–6.2)
ERYTHROCYTE [DISTWIDTH] IN BLOOD BY AUTOMATED COUNT: 13.5 % (ref 12.3–15.4)
GLOBULIN UR ELPH-MCNC: 4.5 GM/DL
GLUCOSE SERPL-MCNC: 104 MG/DL (ref 65–99)
HBA1C MFR BLD: 6.3 % (ref 4.8–5.6)
HCT VFR BLD AUTO: 39.9 % (ref 34–46.6)
HCV AB SER QL: NORMAL
HDLC SERPL-MCNC: 42 MG/DL (ref 40–60)
HGB BLD-MCNC: 13.1 G/DL (ref 12–15.9)
IMM GRANULOCYTES # BLD AUTO: 0.05 10*3/MM3 (ref 0–0.05)
IMM GRANULOCYTES NFR BLD AUTO: 0.5 % (ref 0–0.5)
LDLC SERPL CALC-MCNC: 102 MG/DL (ref 0–100)
LDLC/HDLC SERPL: 2.37 {RATIO}
LYMPHOCYTES # BLD AUTO: 2.94 10*3/MM3 (ref 0.7–3.1)
LYMPHOCYTES NFR BLD AUTO: 27.5 % (ref 19.6–45.3)
MCH RBC QN AUTO: 26.7 PG (ref 26.6–33)
MCHC RBC AUTO-ENTMCNC: 32.8 G/DL (ref 31.5–35.7)
MCV RBC AUTO: 81.3 FL (ref 79–97)
MONOCYTES # BLD AUTO: 0.58 10*3/MM3 (ref 0.1–0.9)
MONOCYTES NFR BLD AUTO: 5.4 % (ref 5–12)
NEUTROPHILS NFR BLD AUTO: 6.79 10*3/MM3 (ref 1.7–7)
NEUTROPHILS NFR BLD AUTO: 63.4 % (ref 42.7–76)
NRBC BLD AUTO-RTO: 0 /100 WBC (ref 0–0.2)
PLATELET # BLD AUTO: 308 10*3/MM3 (ref 140–450)
PMV BLD AUTO: 12.3 FL (ref 6–12)
POTASSIUM SERPL-SCNC: 4.5 MMOL/L (ref 3.5–5.2)
PROT SERPL-MCNC: 8.4 G/DL (ref 6–8.5)
RBC # BLD AUTO: 4.91 10*6/MM3 (ref 3.77–5.28)
SODIUM SERPL-SCNC: 137 MMOL/L (ref 136–145)
TRIGL SERPL-MCNC: 117 MG/DL (ref 0–150)
TSH SERPL DL<=0.05 MIU/L-ACNC: 4.54 UIU/ML (ref 0.27–4.2)
VLDLC SERPL-MCNC: 21 MG/DL (ref 5–40)
WBC NRBC COR # BLD AUTO: 10.7 10*3/MM3 (ref 3.4–10.8)

## 2025-02-11 PROCEDURE — 90472 IMMUNIZATION ADMIN EACH ADD: CPT | Performed by: FAMILY MEDICINE

## 2025-02-11 PROCEDURE — 83036 HEMOGLOBIN GLYCOSYLATED A1C: CPT

## 2025-02-11 PROCEDURE — 80061 LIPID PANEL: CPT

## 2025-02-11 PROCEDURE — 80050 GENERAL HEALTH PANEL: CPT

## 2025-02-11 PROCEDURE — 90677 PCV20 VACCINE IM: CPT | Performed by: FAMILY MEDICINE

## 2025-02-11 PROCEDURE — 90656 IIV3 VACC NO PRSV 0.5 ML IM: CPT | Performed by: FAMILY MEDICINE

## 2025-02-11 PROCEDURE — 86803 HEPATITIS C AB TEST: CPT

## 2025-02-11 PROCEDURE — 99395 PREV VISIT EST AGE 18-39: CPT | Performed by: FAMILY MEDICINE

## 2025-02-11 PROCEDURE — 90471 IMMUNIZATION ADMIN: CPT | Performed by: FAMILY MEDICINE

## 2025-02-11 RX ORDER — HYDROQUINONE 40 MG/G
1 CREAM TOPICAL 2 TIMES DAILY
Qty: 30 EACH | Refills: 2 | Status: SHIPPED | OUTPATIENT
Start: 2025-02-11

## 2025-02-11 NOTE — LETTER
[facility]  Vaccine Consent Form    Patient Name:  Herbie Wong  Patient :  1990     Vaccine(s) Ordered    Pneumococcal Conjugate Vaccine 20-Valent (PCV20)  Fluzone >6mos        Screening Checklist  The following questions should be completed prior to vaccination. If you answer “yes” to any question, it does not necessarily mean you should not be vaccinated. It just means we may need to clarify or ask more questions. If a question is unclear, please ask your healthcare provider to explain it.    Yes No   Any fever or moderate to severe illness today (mild illness and/or antibiotic treatment are not contraindications)?     Do you have a history of a serious reaction to any previous vaccinations, such as anaphylaxis, encephalopathy within 7 days, Guillain-[place] syndrome within 6 weeks, seizure?     Have you received any live vaccine(s) (e.g MMR, RUMA) or any other vaccines in the last month (to ensure duplicate doses aren't given)?     Do you have an anaphylactic allergy to latex (DTaP, DTaP-IPV, Hep A, Hep B, MenB, RV, Td, Tdap), baker’s yeast (Hep B, HPV), polysorbates (RSV, nirsevimab, PCV 20, Rotavirrus, Tdap, Shingrix), or gelatin (RUMA, MMR)?     Do you have an anaphylactic allergy to neomycin (Rabies, RUMA, MMR, IPV, Hep A), polymyxin B (IPV), or streptomycin (IPV)?      Any cancer, leukemia, AIDS, or other immune system disorder? (RUMA, MMR, RV)     Do you have a parent, brother, or sister with an immune system problem (if immune competence of vaccine recipient clinically verified, can proceed)? (MMR, RUMA)     Any recent steroid treatments for >2 weeks, chemotherapy, or radiation treatment? (RUMA, MMR)     Have you received antibody-containing blood transfusions or IVIG in the past 11 months (recommended interval is dependent on product)? (MMR, RUMA)     Have you taken antiviral drugs (acyclovir, famciclovir, valacyclovir for RUMA) in the last 24 or 48 hours, respectively?      Are you pregnant  "or planning to become pregnant within 1 month? (RUMA, MMR, HPV, IPV, MenB, Abrexvy; For Hep B- refer to Engerix-B; For RSV - Abrysvo is indicated for 32-36 weeks of pregnancy from September to January)     For infants, have you ever been told your child has had intussusception or a medical emergency involving obstruction of the intestine (Rotavirus)? If not for an infant, can skip this question.         *Ordering Physicians/APC should be consulted if \"yes\" is checked by the patient or guardian above.  I have received, read, and understand the Vaccine Information Statement (VIS) for each vaccine ordered.  I have considered my or my child's health status as well as the health status of my close contacts.  I have taken the opportunity to discuss my vaccine questions with my or my child's health care provider.   I have requested that the ordered vaccine(s) be given to me or my child.  I understand the benefits and risks of the vaccines.  I understand that I should remain in the clinic for 15 minutes after receiving the vaccine(s).  _________________________________________________________  Signature of Patient or Parent/Legal Guardian ____________________  Date     "

## 2025-02-11 NOTE — PROGRESS NOTES
Subjective   Herbie Wong is a 34 y.o. female and is here for a comprehensive physical exam. The patient reports  darkening skin face . Patient reports last physical date of 1 year  HPI      Do you take any herbs or supplements that were not prescribed by a doctor? no  Are you taking calcium supplements? no  Are you taking aspirin daily? no  Family history of ovarian cancer? no  Family history of breast cancer? Paternal grandmother  FH of endometrial cancer? no  FH of cervical cancer? no  FH of colon cancer? no    Cancer Screening  Mammogram up-to-date?  no  If yes, last exam date: na  BMD up-to-date? no  If yes, last exam date: na  Colonoscopy up-to-date? no   If yes, last exam date: na  Pap up-to-date? yes   If yes, last exam date: per gyn     History:  LMP: Patient's last menstrual period was 01/15/2025 (approximate).  Menopause at na years  Last pap date: last year  Abnormal pap? no  : 0  Para: 0    Immunization History  Tdap?  2017  HPV? yes  Pneumonia? yes  Shingles? not applicable    The following portions of the patient's history were reviewed and updated as appropriate: allergies, current medications, past family history, past medical history, past social history, past surgical history, and problem list.    Past Medical History:   Diagnosis Date    Acquired hypothyroidism     ADHD     Allergic     Mild latex allergy    Anxiety 2018    Asthma 1996    Elevated hemoglobin A1c 2024    Major depression 2018    Obesity     Primary hypertension 2023    Tachycardia 2023       Family History   Problem Relation Age of Onset    Hypertension Father     Hyperlipidemia Father     Hypertension Mother     Asthma Mother     Hyperlipidemia Mother     Diabetes Paternal Grandmother     Breast cancer Paternal Grandmother     Cancer Paternal Grandmother     Cancer Paternal Grandfather     Asthma Brother     Cancer Paternal Uncle        History reviewed. No pertinent surgical  history.    Social History     Socioeconomic History    Marital status:    Tobacco Use    Smoking status: Never    Smokeless tobacco: Never    Tobacco comments:     Second hand smoke from my father growing up   Vaping Use    Vaping status: Never Used   Substance and Sexual Activity    Alcohol use: Not Currently     Comment: Very rarely, maybe once a year, if not less    Drug use: Not Currently    Sexual activity: Yes     Partners: Female     Birth control/protection: Partner of same sex     Comment:  to her wife       Review of Systems  Do you have pain that bothers you in your daily life? not asked  Review of Systems   Constitutional: Negative.    HENT: Negative.     Eyes: Negative.    Respiratory: Negative.     Cardiovascular: Negative.    Gastrointestinal: Negative.    Endocrine: Negative.    Genitourinary: Negative.    Musculoskeletal: Negative.    Skin: Negative.  Positive for color change.   Allergic/Immunologic: Negative.    Neurological: Negative.    Hematological: Negative.    Psychiatric/Behavioral: Negative.     All other systems reviewed and are negative.      Objective   Physical Exam  Vitals and nursing note reviewed.   Constitutional:       General: She is not in acute distress.     Appearance: She is well-developed. She is not diaphoretic.   HENT:      Head: Normocephalic and atraumatic.      Right Ear: Tympanic membrane and external ear normal.      Left Ear: Tympanic membrane and external ear normal.      Nose: No congestion or rhinorrhea.      Mouth/Throat:      Pharynx: No oropharyngeal exudate or posterior oropharyngeal erythema.   Eyes:      General: Lids are normal. No scleral icterus.        Right eye: No discharge.         Left eye: No discharge.      Conjunctiva/sclera: Conjunctivae normal.      Pupils: Pupils are equal, round, and reactive to light.   Neck:      Thyroid: No thyroid mass or thyromegaly.      Vascular: No carotid bruit or JVD.      Trachea: No tracheal  deviation.   Cardiovascular:      Rate and Rhythm: Normal rate and regular rhythm.      Heart sounds: Normal heart sounds. No murmur heard.     No friction rub. No gallop.   Pulmonary:      Effort: Pulmonary effort is normal. No respiratory distress.      Breath sounds: Normal breath sounds. No decreased breath sounds, wheezing, rhonchi or rales.   Chest:      Chest wall: No tenderness.   Abdominal:      General: Bowel sounds are normal. There is no distension.      Palpations: Abdomen is soft. There is no mass.      Tenderness: There is no abdominal tenderness. There is no guarding or rebound.      Hernia: No hernia is present.   Musculoskeletal:         General: Normal range of motion.   Lymphadenopathy:      Cervical: No cervical adenopathy.      Upper Body:      Right upper body: No supraclavicular adenopathy.      Left upper body: No supraclavicular adenopathy.   Skin:     Findings: No bruising, erythema or rash.      Nails: There is no clubbing.      Comments: Darkened circular 5 cm lesion left cheek flat with hyperpigmentation.      Neurological:      Mental Status: She is alert and oriented to person, place, and time.      Cranial Nerves: No cranial nerve deficit.      Deep Tendon Reflexes: Reflexes are normal and symmetric. Reflexes normal.   Psychiatric:         Speech: Speech normal.         Behavior: Behavior normal.         Thought Content: Thought content normal.         Judgment: Judgment normal.          Assessment & Plan   Healthy female exam.      1.   Problem List Items Addressed This Visit          Cardiac and Vasculature    Primary hypertension    Relevant Orders    CBC & Differential    Comprehensive Metabolic Panel       Endocrine and Metabolic    Acquired hypothyroidism    Relevant Orders    TSH    Elevated hemoglobin A1c    Relevant Orders    Hemoglobin A1c    PCOS (polycystic ovarian syndrome)       Health Encounters    Annual physical exam - Primary    Relevant Orders    Lipid Panel     Hepatitis C Antibody       Infectious Diseases    Immunization due    Relevant Orders    Fluzone >6mos (Completed)       Skin    Melasma    Relevant Medications    hydroquinone 4 % cream     Other Visit Diagnoses       BMI 50.0-59.9, adult        Relevant Orders    Pneumococcal Conjugate Vaccine 20-Valent (PCV20) (Completed)              2. Patient Counseling:  --Nutrition: Stressed importance of moderation in sodium/caffeine intake, saturated fat and cholesterol, caloric balance, sufficient intake of fresh fruits, vegetables, fiber, calcium, iron, and 1 mg of folate supplement per day (for females capable of pregnancy).  --Discussed the issue of estrogen replacement, calcium supplement, and the daily use of baby aspirin.  --Exercise: Stressed the importance of regular exercise.   --Substance Abuse: Discussed cessation/primary prevention of tobacco, alcohol, or other drug use; driving or other dangerous activities under the influence; availability of treatment for abuse.    --Sexuality: Discussed sexually transmitted diseases, partner selection, use of condoms, avoidance of unintended pregnancy  and contraceptive alternatives.   --Injury prevention: Discussed safety belts, safety helmets, smoke detector, smoking near bedding or upholstery.   --Dental health: Discussed importance of regular tooth brushing, flossing, and dental visits.  --Immunizations reviewed.  --Discussed benefits of screening colonoscopy.  --After hours service discussed with patient    3. Discussed the patient's BMI with her.  The BMI is above average; BMI management plan is completed  4. Follow up in 6 months

## 2025-02-11 NOTE — LETTER
[facility]  Vaccine Consent Form    Patient Name:  Herbie Wong  Patient :  1990     Vaccine(s) Ordered    Pneumococcal Conjugate Vaccine 20-Valent (PCV20)  Fluzone >6mos        Screening Checklist  The following questions should be completed prior to vaccination. If you answer “yes” to any question, it does not necessarily mean you should not be vaccinated. It just means we may need to clarify or ask more questions. If a question is unclear, please ask your healthcare provider to explain it.    Yes No   Any fever or moderate to severe illness today (mild illness and/or antibiotic treatment are not contraindications)?     Do you have a history of a serious reaction to any previous vaccinations, such as anaphylaxis, encephalopathy within 7 days, Guillain-Frankfort syndrome within 6 weeks, seizure?     Have you received any live vaccine(s) (e.g MMR, RUMA) or any other vaccines in the last month (to ensure duplicate doses aren't given)?     Do you have an anaphylactic allergy to latex (DTaP, DTaP-IPV, Hep A, Hep B, MenB, RV, Td, Tdap), baker’s yeast (Hep B, HPV), polysorbates (RSV, nirsevimab, PCV 20, Rotavirrus, Tdap, Shingrix), or gelatin (RUMA, MMR)?     Do you have an anaphylactic allergy to neomycin (Rabies, RUMA, MMR, IPV, Hep A), polymyxin B (IPV), or streptomycin (IPV)?      Any cancer, leukemia, AIDS, or other immune system disorder? (RUMA, MMR, RV)     Do you have a parent, brother, or sister with an immune system problem (if immune competence of vaccine recipient clinically verified, can proceed)? (MMR, RUMA)     Any recent steroid treatments for >2 weeks, chemotherapy, or radiation treatment? (RUMA, MMR)     Have you received antibody-containing blood transfusions or IVIG in the past 11 months (recommended interval is dependent on product)? (MMR, RUMA)     Have you taken antiviral drugs (acyclovir, famciclovir, valacyclovir for RUMA) in the last 24 or 48 hours, respectively?      Are you pregnant  "or planning to become pregnant within 1 month? (RUMA, MMR, HPV, IPV, MenB, Abrexvy; For Hep B- refer to Engerix-B; For RSV - Abrysvo is indicated for 32-36 weeks of pregnancy from September to January)     For infants, have you ever been told your child has had intussusception or a medical emergency involving obstruction of the intestine (Rotavirus)? If not for an infant, can skip this question.         *Ordering Physicians/APC should be consulted if \"yes\" is checked by the patient or guardian above.  I have received, read, and understand the Vaccine Information Statement (VIS) for each vaccine ordered.  I have considered my or my child's health status as well as the health status of my close contacts.  I have taken the opportunity to discuss my vaccine questions with my or my child's health care provider.   I have requested that the ordered vaccine(s) be given to me or my child.  I understand the benefits and risks of the vaccines.  I understand that I should remain in the clinic for 15 minutes after receiving the vaccine(s).  _________________________________________________________  Signature of Patient or Parent/Legal Guardian ____________________  Date     "

## 2025-02-12 ENCOUNTER — TELEPHONE (OUTPATIENT)
Dept: FAMILY MEDICINE CLINIC | Facility: CLINIC | Age: 35
End: 2025-02-12
Payer: COMMERCIAL

## 2025-02-12 DIAGNOSIS — E03.9 ACQUIRED HYPOTHYROIDISM: ICD-10-CM

## 2025-02-12 RX ORDER — LEVOTHYROXINE SODIUM 50 UG/1
50 TABLET ORAL EVERY MORNING
Qty: 30 TABLET | Refills: 5 | Status: SHIPPED | OUTPATIENT
Start: 2025-02-12

## 2025-02-12 NOTE — PROGRESS NOTES
The results of the labs that we did show a slightly elevated TSH level if you have not been feeling well we could consider increasing your Synthroid dose.  However if you have recently missed some doses of thyroid medicine that can cause the elevation to.  Will plan to just recheck this in 3 months.    Your hemoglobin A1c continues to be slightly elevated and puts you at increased risk for diabetes I think we should follow this every 3 to 6 months.  Continue to work on diet exercise lifestyle changes trying to avoid concentrated sweets and carbohydrates.  All of the rest of your labs look good

## 2025-02-12 NOTE — TELEPHONE ENCOUNTER
Called patient and she voiced understanding about the note below. She has not missed any doses of Synthroid, She would like some called into her pharmacy at [facility].  The results of the labs that we did show a slightly elevated TSH level if you have not been feeling well we could consider increasing your Synthroid dose.  However if you have recently missed some doses of thyroid medicine that can cause the elevatio  n to.  Will plan to just recheck this in 3 months.    Your hemoglobin A1c continues to be slightly elevated and puts you at increased risk for diabetes I think we should follow this every 3 to 6 months.  Continue to work on diet exercise lifestyle changes trying to avoid concentrated sweets and carbohyd  rates.  All of the rest of your labs look good

## 2025-02-13 ENCOUNTER — TELEPHONE (OUTPATIENT)
Dept: FAMILY MEDICINE CLINIC | Facility: CLINIC | Age: 35
End: 2025-02-13
Payer: COMMERCIAL

## 2025-02-13 NOTE — TELEPHONE ENCOUNTER
[employer]    Called patient and left message for her to return our call.    Synthroid dose increased to 50 mcg recheck in 3 months.

## 2025-04-30 DIAGNOSIS — E03.9 ACQUIRED HYPOTHYROIDISM: ICD-10-CM

## 2025-04-30 DIAGNOSIS — I10 HYPERTENSION, UNSPECIFIED TYPE: ICD-10-CM

## 2025-04-30 RX ORDER — LEVOTHYROXINE SODIUM 25 UG/1
25 TABLET ORAL EVERY MORNING
Qty: 90 TABLET | Refills: 0 | Status: SHIPPED | OUTPATIENT
Start: 2025-04-30

## 2025-04-30 RX ORDER — LISINOPRIL 10 MG/1
10 TABLET ORAL DAILY
Qty: 90 TABLET | Refills: 0 | Status: SHIPPED | OUTPATIENT
Start: 2025-04-30

## 2025-06-28 DIAGNOSIS — E28.2 PCOS (POLYCYSTIC OVARIAN SYNDROME): ICD-10-CM

## 2025-08-04 DIAGNOSIS — E03.9 ACQUIRED HYPOTHYROIDISM: ICD-10-CM

## 2025-08-04 DIAGNOSIS — I10 HYPERTENSION, UNSPECIFIED TYPE: ICD-10-CM

## 2025-08-04 RX ORDER — LEVOTHYROXINE SODIUM 25 UG/1
25 TABLET ORAL EVERY MORNING
Qty: 90 TABLET | Refills: 0 | Status: SHIPPED | OUTPATIENT
Start: 2025-08-04

## 2025-08-04 RX ORDER — LISINOPRIL 10 MG/1
10 TABLET ORAL DAILY
Qty: 90 TABLET | Refills: 0 | Status: SHIPPED | OUTPATIENT
Start: 2025-08-04

## 2025-08-11 ENCOUNTER — LAB (OUTPATIENT)
Dept: LAB | Facility: HOSPITAL | Age: 35
End: 2025-08-11
Payer: COMMERCIAL

## 2025-08-11 ENCOUNTER — OFFICE VISIT (OUTPATIENT)
Dept: FAMILY MEDICINE CLINIC | Facility: CLINIC | Age: 35
End: 2025-08-11
Payer: COMMERCIAL

## 2025-08-11 VITALS
HEART RATE: 113 BPM | DIASTOLIC BLOOD PRESSURE: 94 MMHG | WEIGHT: 293 LBS | BODY MASS INDEX: 41.95 KG/M2 | TEMPERATURE: 98.2 F | SYSTOLIC BLOOD PRESSURE: 144 MMHG | OXYGEN SATURATION: 97 % | HEIGHT: 70 IN

## 2025-08-11 DIAGNOSIS — J45.990 EXERCISE INDUCED BRONCHOSPASM: ICD-10-CM

## 2025-08-11 DIAGNOSIS — I10 PRIMARY HYPERTENSION: ICD-10-CM

## 2025-08-11 DIAGNOSIS — E28.2 PCOS (POLYCYSTIC OVARIAN SYNDROME): ICD-10-CM

## 2025-08-11 DIAGNOSIS — E03.9 ACQUIRED HYPOTHYROIDISM: ICD-10-CM

## 2025-08-11 DIAGNOSIS — E66.01 MORBID (SEVERE) OBESITY DUE TO EXCESS CALORIES: ICD-10-CM

## 2025-08-11 DIAGNOSIS — R73.09 ELEVATED HEMOGLOBIN A1C: ICD-10-CM

## 2025-08-11 DIAGNOSIS — I10 PRIMARY HYPERTENSION: Primary | ICD-10-CM

## 2025-08-11 DIAGNOSIS — E11.9 TYPE 2 DIABETES MELLITUS WITHOUT COMPLICATION, WITHOUT LONG-TERM CURRENT USE OF INSULIN: ICD-10-CM

## 2025-08-11 LAB
ANION GAP SERPL CALCULATED.3IONS-SCNC: 13.8 MMOL/L (ref 5–15)
BUN SERPL-MCNC: 14 MG/DL (ref 6–20)
BUN/CREAT SERPL: 15.7 (ref 7–25)
CALCIUM SPEC-SCNC: 9.9 MG/DL (ref 8.6–10.5)
CHLORIDE SERPL-SCNC: 100 MMOL/L (ref 98–107)
CO2 SERPL-SCNC: 24.2 MMOL/L (ref 22–29)
CREAT SERPL-MCNC: 0.89 MG/DL (ref 0.57–1)
EGFRCR SERPLBLD CKD-EPI 2021: 86.3 ML/MIN/1.73
EXPIRATION DATE: ABNORMAL
GLUCOSE SERPL-MCNC: 126 MG/DL (ref 65–99)
HBA1C MFR BLD: 6.2 % (ref 4.5–5.7)
Lab: ABNORMAL
POTASSIUM SERPL-SCNC: 4.2 MMOL/L (ref 3.5–5.2)
SODIUM SERPL-SCNC: 138 MMOL/L (ref 136–145)
TSH SERPL DL<=0.05 MIU/L-ACNC: 4.22 UIU/ML (ref 0.27–4.2)

## 2025-08-11 PROCEDURE — 99214 OFFICE O/P EST MOD 30 MIN: CPT | Performed by: FAMILY MEDICINE

## 2025-08-11 PROCEDURE — 80048 BASIC METABOLIC PNL TOTAL CA: CPT

## 2025-08-11 PROCEDURE — 84443 ASSAY THYROID STIM HORMONE: CPT

## 2025-08-11 PROCEDURE — 83036 HEMOGLOBIN GLYCOSYLATED A1C: CPT | Performed by: FAMILY MEDICINE

## 2025-08-11 RX ORDER — ALBUTEROL SULFATE 90 UG/1
2 INHALANT RESPIRATORY (INHALATION) EVERY 4 HOURS PRN
Qty: 18 G | Refills: 3 | Status: SHIPPED | OUTPATIENT
Start: 2025-08-11